# Patient Record
Sex: FEMALE | Race: WHITE | NOT HISPANIC OR LATINO | Employment: OTHER | URBAN - METROPOLITAN AREA
[De-identification: names, ages, dates, MRNs, and addresses within clinical notes are randomized per-mention and may not be internally consistent; named-entity substitution may affect disease eponyms.]

---

## 2018-05-14 LAB
ALBUMIN SERPL BCP-MCNC: 4.2 G/DL (ref 3.5–5.7)
ALP SERPL-CCNC: 55 IU/L (ref 55–165)
ALT SERPL W P-5'-P-CCNC: 17 IU/L (ref 5–26)
ANION GAP SERPL CALCULATED.3IONS-SCNC: 14.7 MM/L
APTT PPP: 25.6 SEC (ref 24.4–37.6)
AST SERPL W P-5'-P-CCNC: 36 U/L (ref 7–26)
BASOPHILS # BLD AUTO: 0.1 X3/UL (ref 0–0.3)
BASOPHILS # BLD AUTO: 0.9 % (ref 0–2)
BILIRUB SERPL-MCNC: 0.6 MG/DL (ref 0.3–1)
BILIRUB UR QL STRIP: NEGATIVE
BUN SERPL-MCNC: 38 MG/DL (ref 7–25)
CALCIUM SERPL-MCNC: 9.5 MG/DL (ref 8.6–10.5)
CHLORIDE SERPL-SCNC: 105 MM/L (ref 98–107)
CLARITY UR: CLEAR
CO2 SERPL-SCNC: 24 MM/L (ref 21–31)
COLOR UR: YELLOW
CREAT SERPL-MCNC: 0.96 MG/DL (ref 0.6–1.2)
DEPRECATED RDW RBC AUTO: 14.9 %
EGFR (HISTORICAL): 55 GFR
EGFR AFRICAN AMERICAN (HISTORICAL): > 60 GFR
EOSINOPHIL # BLD AUTO: 0.2 X3/UL (ref 0–0.5)
EOSINOPHIL NFR BLD AUTO: 2.8 % (ref 0–5)
GLUCOSE (HISTORICAL): 187 MG/DL (ref 65–99)
GLUCOSE UR STRIP-MCNC: ABNORMAL MG/DL
HCT VFR BLD AUTO: 36.1 % (ref 37–47)
HGB BLD-MCNC: 11.8 G/DL (ref 12–16)
HGB UR QL STRIP.AUTO: NEGATIVE
INR PPP: 1.05 (ref 0.9–1.5)
KETONES UR STRIP-MCNC: NEGATIVE MG/DL
LDL CHOLESTEROL DIRECT (HISTORICAL): 69 MG/DL
LEUKOCYTE ESTERASE UR QL STRIP: ABNORMAL
LIPASE SERPL-CCNC: 98 U/L (ref 11–82)
LYMPHOCYTES # BLD AUTO: 1.5 X3/UL (ref 1.2–4.2)
LYMPHOCYTES NFR BLD AUTO: 21.3 % (ref 20.5–51.1)
MCH RBC QN AUTO: 30.8 PG (ref 26–34)
MCHC RBC AUTO-ENTMCNC: 32.8 G/DL (ref 31–37)
MCV RBC AUTO: 94 FL (ref 81–99)
MONOCYTES # BLD AUTO: 0.6 X3/UL (ref 0–1)
MONOCYTES NFR BLD AUTO: 8.6 % (ref 1.7–12)
NEUTROPHILS # BLD AUTO: 4.8 X3/UL (ref 1.4–6.5)
NEUTS SEG NFR BLD AUTO: 66.4 % (ref 42.2–75.2)
NITRITE UR QL STRIP: NEGATIVE
NON-SQ EPI CELLS URNS QL MICRO: ABNORMAL /LPF
OSMOLALITY, SERUM (HISTORICAL): 292 MOSM (ref 262–291)
PH UR STRIP.AUTO: 6 [PH] (ref 4.5–8)
PLATELET # BLD AUTO: 207 X3/UL (ref 130–400)
PMV BLD AUTO: 9.8 FL
POTASSIUM SERPL-SCNC: 4.7 MM/L (ref 3.5–5.5)
PROT UR STRIP-MCNC: NEGATIVE MG/DL
PROTHROMBIN TIME (HISTORICAL): 12.1 SEC (ref 10.1–12.9)
RBC # BLD AUTO: 3.84 X6/UL (ref 3.9–5.2)
SODIUM SERPL-SCNC: 139 MM/L (ref 134–143)
SP GR UR STRIP.AUTO: 1.02 (ref 1–1.03)
TOTAL PROTEIN (HISTORICAL): 6.6 G/DL (ref 6.4–8.9)
TROPONIN I SERPL-MCNC: 0.03 NG/ML
TROPONIN I SERPL-MCNC: 6.44 NG/ML
TROPONIN I SERPL-MCNC: 9.37 NG/ML
TSH SERPL DL<=0.05 MIU/L-ACNC: 2.71 UIU/M (ref 0.45–5.33)
UROBILINOGEN UR QL STRIP.AUTO: 0.2 EU/DL (ref 0.2–8)
WBC # BLD AUTO: 7.2 X3/UL (ref 4.8–10.8)
WBC #/AREA URNS AUTO: ABNORMAL /HPF

## 2018-05-21 LAB
ALBUMIN SERPL BCP-MCNC: 3.6 G/DL (ref 3.5–5.7)
ALP SERPL-CCNC: 43 IU/L (ref 55–165)
ALT SERPL W P-5'-P-CCNC: 16 IU/L (ref 5–26)
AMPHETAMINES (HISTORICAL): NEGATIVE
ANION GAP SERPL CALCULATED.3IONS-SCNC: 10.2 MM/L
APTT PPP: 30.2 SEC (ref 24.4–37.6)
AST SERPL W P-5'-P-CCNC: 41 U/L (ref 7–26)
BACTERIA UR QL AUTO: ABNORMAL /HPF
BARBITURATES (HISTORICAL): NEGATIVE
BASOPHILS # BLD AUTO: 0 X3/UL (ref 0–0.3)
BASOPHILS # BLD AUTO: 0.4 % (ref 0–2)
BENZODIAZEPINES (HISTORICAL): NEGATIVE
BILIRUB SERPL-MCNC: 0.3 MG/DL (ref 0.3–1)
BILIRUB UR QL STRIP: NEGATIVE
BUN SERPL-MCNC: 37 MG/DL (ref 7–25)
CALCIUM SERPL-MCNC: 9.3 MG/DL (ref 8.6–10.5)
CANNABINOIDS (HISTORICAL): NEGATIVE
CHLORIDE SERPL-SCNC: 105 MM/L (ref 98–107)
CLARITY UR: CLEAR
CO2 SERPL-SCNC: 28 MM/L (ref 21–31)
COCAINE (HISTORICAL): NEGATIVE
COLOR UR: YELLOW
CREAT SERPL-MCNC: 1.04 MG/DL (ref 0.6–1.2)
DEPRECATED RDW RBC AUTO: 14.6 %
EGFR (HISTORICAL): 50 GFR
EGFR AFRICAN AMERICAN (HISTORICAL): 60 GFR
EOSINOPHIL # BLD AUTO: 0 X3/UL (ref 0–0.5)
EOSINOPHIL NFR BLD AUTO: 0.8 % (ref 0–5)
GLUCOSE (HISTORICAL): 143 MG/DL (ref 65–99)
GLUCOSE UR STRIP-MCNC: NEGATIVE MG/DL
HCT VFR BLD AUTO: 28.7 % (ref 37–47)
HGB BLD-MCNC: 9.3 G/DL (ref 12–16)
HGB UR QL STRIP.AUTO: NEGATIVE
INR PPP: 0.97 (ref 0.9–1.5)
KETONES UR STRIP-MCNC: NEGATIVE MG/DL
LDL CHOLESTEROL DIRECT (HISTORICAL): 52 MG/DL
LEUKOCYTE ESTERASE UR QL STRIP: ABNORMAL
LYMPHOCYTES # BLD AUTO: 0.8 X3/UL (ref 1.2–4.2)
LYMPHOCYTES NFR BLD AUTO: 12.7 % (ref 20.5–51.1)
MCH RBC QN AUTO: 30.7 PG (ref 26–34)
MCHC RBC AUTO-ENTMCNC: 32.5 G/DL (ref 31–37)
MCV RBC AUTO: 94.5 FL (ref 81–99)
METHADONE (HISTORICAL): NEGATIVE
MONOCYTES # BLD AUTO: 0.5 X3/UL (ref 0–1)
MONOCYTES NFR BLD AUTO: 9 % (ref 1.7–12)
NEUTROPHILS # BLD AUTO: 4.7 X3/UL (ref 1.4–6.5)
NEUTS SEG NFR BLD AUTO: 77.1 % (ref 42.2–75.2)
NITRITE UR QL STRIP: NEGATIVE
NON-SQ EPI CELLS URNS QL MICRO: ABNORMAL /LPF
OPIATES (HISTORICAL): NEGATIVE
OSMOLALITY, SERUM (HISTORICAL): 289 MOSM (ref 262–291)
OXYCODONE (HISTORICAL): NEGATIVE
PH UR STRIP.AUTO: 5.5 [PH] (ref 4.5–8)
PHENCYCLIDINE URINE (HISTORICAL): NEGATIVE
PLATELET # BLD AUTO: 173 X3/UL (ref 130–400)
PLEASE NOTE (HISTORICAL): NORMAL
PMV BLD AUTO: 9.5 FL
POTASSIUM SERPL-SCNC: 4.2 MM/L (ref 3.5–5.5)
PROPOXYPHENE (HISTORICAL): NEGATIVE
PROT UR STRIP-MCNC: NEGATIVE MG/DL
PROTHROMBIN TIME (HISTORICAL): 11.2 SEC (ref 10.1–12.9)
RBC # BLD AUTO: 3.04 X6/UL (ref 3.9–5.2)
RBC #/AREA URNS AUTO: ABNORMAL /HPF
SODIUM SERPL-SCNC: 139 MM/L (ref 134–143)
SP GR UR STRIP.AUTO: 1.02 (ref 1–1.03)
TOTAL PROTEIN (HISTORICAL): 5.9 G/DL (ref 6.4–8.9)
TROPONIN I SERPL-MCNC: 1.65 NG/ML
UROBILINOGEN UR QL STRIP.AUTO: 0.2 EU/DL (ref 0.2–8)
WBC # BLD AUTO: 6 X3/UL (ref 4.8–10.8)
WBC #/AREA URNS AUTO: ABNORMAL /HPF

## 2018-06-29 ENCOUNTER — HOSPITAL ENCOUNTER (EMERGENCY)
Facility: HOSPITAL | Age: 83
DRG: 377 | End: 2018-06-29
Attending: INTERNAL MEDICINE | Admitting: INTERNAL MEDICINE
Payer: MEDICARE

## 2018-06-29 ENCOUNTER — HOSPITAL ENCOUNTER (INPATIENT)
Facility: HOSPITAL | Age: 83
LOS: 2 days | Discharge: HOME | DRG: 377 | End: 2018-07-01
Attending: INTERNAL MEDICINE | Admitting: INTERNAL MEDICINE
Payer: MEDICARE

## 2018-06-29 ENCOUNTER — APPOINTMENT (EMERGENCY)
Dept: RADIOLOGY | Facility: HOSPITAL | Age: 83
DRG: 377 | End: 2018-06-29
Payer: MEDICARE

## 2018-06-29 VITALS
HEIGHT: 60 IN | HEART RATE: 68 BPM | SYSTOLIC BLOOD PRESSURE: 135 MMHG | WEIGHT: 105 LBS | DIASTOLIC BLOOD PRESSURE: 40 MMHG | OXYGEN SATURATION: 98 % | RESPIRATION RATE: 16 BRPM | BODY MASS INDEX: 20.62 KG/M2 | TEMPERATURE: 98.6 F

## 2018-06-29 DIAGNOSIS — K29.01 ACUTE SUPERFICIAL GASTRITIS WITH HEMORRHAGE: ICD-10-CM

## 2018-06-29 DIAGNOSIS — D64.9 ANEMIA: ICD-10-CM

## 2018-06-29 DIAGNOSIS — I25.10 CAD (CORONARY ARTERY DISEASE): ICD-10-CM

## 2018-06-29 DIAGNOSIS — D64.9 ANEMIA, UNSPECIFIED TYPE: Primary | ICD-10-CM

## 2018-06-29 DIAGNOSIS — K92.2 UPPER GI BLEED: Primary | ICD-10-CM

## 2018-06-29 PROBLEM — N18.30 CKD (CHRONIC KIDNEY DISEASE) STAGE 3, GFR 30-59 ML/MIN (HCC): Status: ACTIVE | Noted: 2018-05-15

## 2018-06-29 PROBLEM — I51.89 DIASTOLIC DYSFUNCTION: Status: ACTIVE | Noted: 2018-05-16

## 2018-06-29 PROBLEM — E11.9 TYPE 2 DIABETES MELLITUS WITHOUT COMPLICATION (HCC): Status: ACTIVE | Noted: 2018-05-15

## 2018-06-29 LAB
ABO GROUP BLD: NORMAL
ALBUMIN SERPL BCP-MCNC: 3.8 G/DL (ref 3.5–5.7)
ALP SERPL-CCNC: 53 U/L (ref 55–165)
ALT SERPL W P-5'-P-CCNC: 16 U/L (ref 7–52)
ANION GAP SERPL CALCULATED.3IONS-SCNC: 3 MMOL/L (ref 4–13)
APTT PPP: 29 SECONDS (ref 24–36)
AST SERPL W P-5'-P-CCNC: 24 U/L (ref 13–39)
BASOPHILS # BLD AUTO: 0 THOUSANDS/ΜL (ref 0–0.1)
BASOPHILS NFR BLD AUTO: 1 % (ref 0–1)
BILIRUB SERPL-MCNC: 0.4 MG/DL (ref 0.2–1)
BLD GP AB SCN SERPL QL: NEGATIVE
BNP SERPL-MCNC: 592 PG/ML (ref 1–100)
BUN SERPL-MCNC: 44 MG/DL (ref 7–25)
CALCIUM SERPL-MCNC: 9.2 MG/DL (ref 8.6–10.5)
CHLORIDE SERPL-SCNC: 105 MMOL/L (ref 98–107)
CO2 SERPL-SCNC: 32 MMOL/L (ref 21–31)
CREAT SERPL-MCNC: 0.93 MG/DL (ref 0.6–1.2)
EOSINOPHIL # BLD AUTO: 0.2 THOUSAND/ΜL (ref 0–0.61)
EOSINOPHIL NFR BLD AUTO: 4 % (ref 0–6)
ERYTHROCYTE [DISTWIDTH] IN BLOOD BY AUTOMATED COUNT: 13.9 % (ref 11.6–15.1)
GFR SERPL CREATININE-BSD FRML MDRD: 55 ML/MIN/1.73SQ M
GLUCOSE SERPL-MCNC: 105 MG/DL (ref 65–140)
GLUCOSE SERPL-MCNC: 149 MG/DL (ref 65–140)
HCT VFR BLD AUTO: 23 % (ref 37–47)
HGB BLD-MCNC: 7.6 G/DL (ref 11.5–15.4)
INR PPP: 1.06 (ref 0.9–1.5)
LYMPHOCYTES # BLD AUTO: 1.5 THOUSANDS/ΜL (ref 0.6–4.47)
LYMPHOCYTES NFR BLD AUTO: 23 % (ref 14–44)
MCH RBC QN AUTO: 30.2 PG (ref 26.8–34.3)
MCHC RBC AUTO-ENTMCNC: 33.2 G/DL (ref 31.4–37.4)
MCV RBC AUTO: 91 FL (ref 82–98)
MONOCYTES # BLD AUTO: 0.6 THOUSAND/ΜL (ref 0.17–1.22)
MONOCYTES NFR BLD AUTO: 10 % (ref 4–12)
NEUTROPHILS # BLD AUTO: 4.1 THOUSANDS/ΜL (ref 1.85–7.62)
NEUTS SEG NFR BLD AUTO: 63 % (ref 43–75)
NRBC BLD AUTO-RTO: 0 /100 WBCS
PLATELET # BLD AUTO: 173 THOUSANDS/UL (ref 149–390)
PMV BLD AUTO: 9.5 FL (ref 8.9–12.7)
POTASSIUM SERPL-SCNC: 4.3 MMOL/L (ref 3.5–5.5)
PROT SERPL-MCNC: 6.4 G/DL (ref 6.4–8.9)
PROTHROMBIN TIME: 12.2 SECONDS (ref 10.1–12.9)
RBC # BLD AUTO: 2.53 MILLION/UL (ref 3.81–5.12)
RH BLD: POSITIVE
SODIUM SERPL-SCNC: 140 MMOL/L (ref 134–143)
SPECIMEN EXPIRATION DATE: NORMAL
TROPONIN I SERPL-MCNC: <0.03 NG/ML
WBC # BLD AUTO: 6.5 THOUSAND/UL (ref 4.31–10.16)

## 2018-06-29 PROCEDURE — 82948 REAGENT STRIP/BLOOD GLUCOSE: CPT

## 2018-06-29 PROCEDURE — 86850 RBC ANTIBODY SCREEN: CPT | Performed by: INTERNAL MEDICINE

## 2018-06-29 PROCEDURE — 99223 1ST HOSP IP/OBS HIGH 75: CPT | Performed by: INTERNAL MEDICINE

## 2018-06-29 PROCEDURE — 86920 COMPATIBILITY TEST SPIN: CPT

## 2018-06-29 PROCEDURE — 80053 COMPREHEN METABOLIC PANEL: CPT | Performed by: INTERNAL MEDICINE

## 2018-06-29 PROCEDURE — 82270 OCCULT BLOOD FECES: CPT | Performed by: INTERNAL MEDICINE

## 2018-06-29 PROCEDURE — P9021 RED BLOOD CELLS UNIT: HCPCS

## 2018-06-29 PROCEDURE — 86901 BLOOD TYPING SEROLOGIC RH(D): CPT | Performed by: INTERNAL MEDICINE

## 2018-06-29 PROCEDURE — 85025 COMPLETE CBC W/AUTO DIFF WBC: CPT | Performed by: INTERNAL MEDICINE

## 2018-06-29 PROCEDURE — 71046 X-RAY EXAM CHEST 2 VIEWS: CPT

## 2018-06-29 PROCEDURE — 83880 ASSAY OF NATRIURETIC PEPTIDE: CPT | Performed by: INTERNAL MEDICINE

## 2018-06-29 PROCEDURE — 99285 EMERGENCY DEPT VISIT HI MDM: CPT

## 2018-06-29 PROCEDURE — 84484 ASSAY OF TROPONIN QUANT: CPT | Performed by: INTERNAL MEDICINE

## 2018-06-29 PROCEDURE — 93005 ELECTROCARDIOGRAM TRACING: CPT

## 2018-06-29 PROCEDURE — 30233N1 TRANSFUSION OF NONAUTOLOGOUS RED BLOOD CELLS INTO PERIPHERAL VEIN, PERCUTANEOUS APPROACH: ICD-10-PCS | Performed by: HOSPITALIST

## 2018-06-29 PROCEDURE — 86900 BLOOD TYPING SEROLOGIC ABO: CPT | Performed by: INTERNAL MEDICINE

## 2018-06-29 PROCEDURE — 36415 COLL VENOUS BLD VENIPUNCTURE: CPT | Performed by: INTERNAL MEDICINE

## 2018-06-29 PROCEDURE — 85610 PROTHROMBIN TIME: CPT | Performed by: INTERNAL MEDICINE

## 2018-06-29 PROCEDURE — 85730 THROMBOPLASTIN TIME PARTIAL: CPT | Performed by: INTERNAL MEDICINE

## 2018-06-29 RX ORDER — FUROSEMIDE 10 MG/ML
40 INJECTION INTRAMUSCULAR; INTRAVENOUS
Status: DISCONTINUED | OUTPATIENT
Start: 2018-06-30 | End: 2018-06-29

## 2018-06-29 RX ORDER — METOPROLOL TARTRATE 50 MG/1
50 TABLET, FILM COATED ORAL 2 TIMES DAILY
Status: DISCONTINUED | OUTPATIENT
Start: 2018-06-29 | End: 2018-06-30

## 2018-06-29 RX ORDER — CLOPIDOGREL BISULFATE 75 MG/1
75 TABLET ORAL DAILY
Status: DISCONTINUED | OUTPATIENT
Start: 2018-06-30 | End: 2018-07-01 | Stop reason: HOSPADM

## 2018-06-29 RX ORDER — NITROGLYCERIN 0.4 MG/1
0.4 TABLET SUBLINGUAL AS NEEDED
COMMUNITY
Start: 2018-05-17

## 2018-06-29 RX ORDER — CLOPIDOGREL BISULFATE 75 MG/1
75 TABLET ORAL DAILY
COMMUNITY
Start: 2018-05-18

## 2018-06-29 RX ORDER — METOPROLOL TARTRATE 50 MG/1
50 TABLET, FILM COATED ORAL 2 TIMES DAILY
COMMUNITY
Start: 2018-05-23

## 2018-06-29 RX ORDER — ASPIRIN 81 MG/1
81 TABLET, CHEWABLE ORAL
COMMUNITY
Start: 2018-05-18

## 2018-06-29 RX ORDER — ASPIRIN 81 MG/1
81 TABLET, CHEWABLE ORAL DAILY
Status: DISCONTINUED | OUTPATIENT
Start: 2018-06-30 | End: 2018-07-01 | Stop reason: HOSPADM

## 2018-06-29 RX ORDER — ACETAMINOPHEN 325 MG/1
650 TABLET ORAL EVERY 6 HOURS PRN
Status: DISCONTINUED | OUTPATIENT
Start: 2018-06-29 | End: 2018-07-01 | Stop reason: HOSPADM

## 2018-06-29 RX ORDER — LISINOPRIL 5 MG/1
5 TABLET ORAL DAILY
COMMUNITY
Start: 2018-05-18

## 2018-06-29 RX ORDER — LISINOPRIL 5 MG/1
5 TABLET ORAL DAILY
Status: DISCONTINUED | OUTPATIENT
Start: 2018-06-30 | End: 2018-06-30

## 2018-06-29 RX ORDER — MELOXICAM 7.5 MG/1
7.5 TABLET ORAL DAILY
COMMUNITY

## 2018-06-29 RX ORDER — ATORVASTATIN CALCIUM 40 MG/1
40 TABLET, FILM COATED ORAL
COMMUNITY
Start: 2018-05-17

## 2018-06-29 RX ORDER — ATORVASTATIN CALCIUM 40 MG/1
40 TABLET, FILM COATED ORAL
Status: DISCONTINUED | OUTPATIENT
Start: 2018-06-29 | End: 2018-07-01 | Stop reason: HOSPADM

## 2018-06-29 RX ADMIN — METOPROLOL TARTRATE 50 MG: 50 TABLET ORAL at 21:54

## 2018-06-29 RX ADMIN — ACETAMINOPHEN 650 MG: 325 TABLET ORAL at 21:35

## 2018-06-29 NOTE — ED PROVIDER NOTES
History  Chief Complaint   Patient presents with    Leg Swelling     80years old female with current history of acute myocardial infarction status post 3 stents placement 6 weeks ago at [facility] presents to the emergency room this afternoon with bilateral legs and ankle edema and swelling started this morning associated with no chest pain or shortness of breath abdominal pain nausea vomiting fever or chills  Patient currently is not taking on any Lasix   History provided by:  Patient  Ankle Swelling   Location:  Leg, ankle and foot  Injury: no    Leg location:  L leg and R leg  Pain details:     Severity:  No pain  Chronicity:  New  Associated symptoms: fatigue    Associated symptoms: no back pain and no fever        Prior to Admission Medications   Prescriptions Last Dose Informant Patient Reported? Taking?    Amino Acids (DAILY AMINO 6000 PO) 6/29/2018 at Unknown time  Yes Yes   Sig: Take 20 mg by mouth daily   aspirin 81 mg chewable tablet 6/29/2018 at Unknown time  Yes Yes   Sig: Chew 81 mg   atorvastatin (LIPITOR) 40 mg tablet 6/29/2018 at Unknown time  Yes Yes   Sig: Take 40 mg by mouth   clopidogrel (PLAVIX) 75 mg tablet 6/29/2018 at Unknown time  Yes Yes   Sig: Take 75 mg by mouth daily   lisinopril (ZESTRIL) 5 mg tablet 6/29/2018 at Unknown time  Yes Yes   Sig: Take 5 mg by mouth daily   meloxicam (MOBIC) 7 5 mg tablet 6/29/2018 at Unknown time  Yes Yes   Sig: Take 7 5 mg by mouth daily   metoprolol tartrate (LOPRESSOR) 50 mg tablet 6/29/2018 at Unknown time  Yes Yes   Sig: Take 50 mg by mouth 2 (two) times a day   nitroglycerin (NITROSTAT) 0 4 mg SL tablet More than a month at Unknown time  Yes No   Sig: Place 0 4 mg under the tongue as needed      Facility-Administered Medications: None       Past Medical History:   Diagnosis Date    [facility]     [facility] infarct [facility]     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([facility] )        Past Surgical History:   Procedure Laterality Date    APPENDECTOMY  CARDIAC CATHETERIZATION      3 stents    CAROTID ENDARTARECTOMY      GALLBLADDER SURGERY      HYSTERECTOMY         History reviewed  No pertinent family history  I have reviewed and agree with the history as documented  Social History   Substance Use Topics    Smoking status: Never Smoker    Smokeless tobacco: Never Used    Alcohol use No        Review of Systems   Constitutional: Positive for fatigue  Negative for chills and fever  HENT: Negative  Negative for rhinorrhea and sore throat  Eyes: Negative  Negative for visual disturbance  Respiratory: Negative  Negative for cough and shortness of breath  Cardiovascular: Positive for leg swelling  Negative for chest pain  Gastrointestinal: Negative  Negative for abdominal pain, diarrhea, nausea and vomiting  Endocrine: Negative  Genitourinary: Negative  Negative for dysuria  Musculoskeletal: Negative  Negative for back pain and myalgias  Skin: Negative for rash  Allergic/Immunologic: Negative  Neurological: Negative  Negative for dizziness and headaches  Psychiatric/Behavioral: Negative  Negative for confusion  All other systems reviewed and are negative  Physical Exam  Physical Exam   Constitutional: She is oriented to person, place, and time  She appears well-developed and well-nourished  No distress  HENT:   Head: Normocephalic and atraumatic  Eyes: EOM are normal  Pupils are equal, round, and reactive to light  Neck: Normal range of motion  Neck supple  No JVD present  No tracheal deviation present  No thyromegaly present  Cardiovascular: Normal rate, regular rhythm, normal heart sounds and intact distal pulses  Exam reveals no gallop and no friction rub  No murmur heard  Pulmonary/Chest: Effort normal  No respiratory distress  She has rales  Abdominal: Soft  Bowel sounds are normal  She exhibits no distension  Neurological: She is alert and oriented to person, place, and time     Skin: Skin is warm and dry  She is not diaphoretic  Psychiatric: She has a normal mood and affect   Her behavior is normal        Vital Signs  ED Triage Vitals [06/29/18 1243]   Temperature Pulse Respirations Blood Pressure SpO2   98 4 °F (36 9 °C) 64 18 143/58 96 %      Temp Source Heart Rate Source Patient Position - Orthostatic VS BP Location FiO2 (%)   Tympanic Monitor Sitting Left arm --      Pain Score       No Pain           Vitals:    06/29/18 1243   BP: 143/58   Pulse: 64   Patient Position - Orthostatic VS: Sitting       Visual Acuity      ED Medications  Medications - No data to display    Diagnostic Studies  Results Reviewed     Procedure Component Value Units Date/Time    POCT occult blood stool [[id number]]     Lab Status:  No result Specimen:  Stool     B-Type Natriuretic Peptide [facility] ONLY) [[id number]]  (Abnormal) Collected:  06/29/18 1312    Lab Status:  Final result Specimen:  Blood from Arm, Left Updated:  06/29/18 1349      (H) pg/mL     Protime-INR [[id number]]  (Normal) Collected:  06/29/18 1312    Lab Status:  Final result Specimen:  Blood from Arm, Left Updated:  06/29/18 1348     Protime 12 2 seconds      INR 1 06    APTT [[id number]]  (Normal) Collected:  06/29/18 1312    Lab Status:  Final result Specimen:  Blood from Arm, Left Updated:  06/29/18 1347     PTT 29 seconds     Comprehensive metabolic panel [[id number]]  (Abnormal) Collected:  06/29/18 1312    Lab Status:  Final result Specimen:  Blood from Arm, Left Updated:  06/29/18 1347     Sodium 140 mmol/L      Potassium 4 3 mmol/L      Chloride 105 mmol/L      CO2 32 (H) mmol/L      Anion Gap 3 (L) mmol/L      BUN 44 (H) mg/dL      Creatinine 0 93 mg/dL      Glucose 149 (H) mg/dL      Calcium 9 2 mg/dL      AST 24 U/L      ALT 16 U/L      Alkaline Phosphatase 53 (L) U/L      Total Protein 6 4 g/dL      Albumin 3 8 g/dL      Total Bilirubin 0 40 mg/dL      eGFR 55 ml/min/1 73sq m     Narrative:         National Kidney Disease Education Program recommendations are as follows:  GFR calculation is accurate only with a steady state creatinine  Chronic Kidney disease less than 60 ml/min/1 73 sq  meters  Kidney failure less than 15 ml/min/1 73 sq  meters  Troponin I [[id number]]  (Normal) Collected:  06/29/18 1312    Lab Status:  Final result Specimen:  Blood from Arm, Left Updated:  06/29/18 1347     Troponin I <0 03 ng/mL     CBC and differential [[id number]]  (Abnormal) Collected:  06/29/18 1312    Lab Status:  Final result Specimen:  Blood from Arm, Left Updated:  06/29/18 1345     WBC 6 50 Thousand/uL      RBC 2 53 (L) Million/uL      Hemoglobin 7 6 (L) g/dL      Hematocrit 23 0 (L) %      MCV 91 fL      MCH 30 2 pg      MCHC 33 2 g/dL      RDW 13 9 %      MPV 9 5 fL      Platelets 936 Thousands/uL      nRBC 0 /100 WBCs      Neutrophils Relative 63 %      Lymphocytes Relative 23 %      Monocytes Relative 10 %      Eosinophils Relative 4 %      Basophils Relative 1 %      Neutrophils Absolute 4 10 Thousands/µL      Lymphocytes Absolute 1 50 Thousands/µL      Monocytes Absolute 0 60 Thousand/µL      Eosinophils Absolute 0 20 Thousand/µL      Basophils Absolute 0 00 Thousands/µL                  X-ray chest 2 views   Final Result by Carolyn Reyna MD (06/29 1523)   Mild suboptimal inspiration without finding of active focal pulmonary   consolidation with air bronchograms or large pleural effusion  Cardiomegaly with mild congestive changes without overt cardiac failure           Signed by Carolyn Reyna MD                 Procedures  Procedures       Phone Contacts  ED Phone Contact    ED Course  ED Course as of Jun 29 1535 Fri Jun 29, 2018   1442 Hemoglobin: (!) 7 6   1512 BNP: (!) 592   1513 Nsr, hr 61 , PAC;S old anterior -septal infarct ECG 12 lead                               MDM  CritCare Time    Disposition  Final diagnoses:   Upper GI bleed   Anemia     Time reflects when diagnosis was documented in both MDM as applicable and the Disposition within this note     Time User Action Codes Description Comment    6/29/2018  3:17 PM Geraldmarc Guaman Add [K92 2] Upper GI bleed     6/29/2018  3:18 PM Tri Bhatt Add [D64 9] Anemia       ED Disposition     ED Disposition Condition Comment    Transfer to Another [address] [place] [address] 34 should be transferred out to [facility]   MD Documentation      Most Recent Value   Patient Condition  The patient has been stabilized such that within reasonable medical probability, no material deterioration of the patient condition or the condition of the unborn child(maris) is likely to result from the transfer   Reason for Transfer  Level of Care needed not available at this facility   Benefits of Transfer  Other benefits (Include comment)_______________________, Specialized equipment and/or services available at the receiving facility (Include comment)________________________ [facility] ]   Risks of Transfer  Potential deterioration of medical condition, Potential for delay in receiving treatment, Possible worsening of condition or death during transfer   Accepting Physician  -- [Dr Vaughn]   Accepting Facility Name, [place] [address]   -- [[facility]]   Sending MD  -- Clint Adjutant      RN Documentation      Most [facility] Name, [place] [address]   -- [[facility]]      Follow-up Information    None         Patient's Medications   Discharge Prescriptions    No medications on file     No discharge procedures on file      ED Provider  Electronically Signed by           Dyllan Serrato MD  06/29/18 1476

## 2018-06-29 NOTE — ASSESSMENT & PLAN NOTE
-suspected GI bleed with stool positive for occult blood  -transfuse 2 units PRBC  -will continue ASA/Plavix given patient recently had stents placed  -no melena or hematemesis  -GI consult  -Protonix IV

## 2018-06-29 NOTE — EMTALA/ACUTE CARE TRANSFER
[facility] [address]  [address] [place] [id number]  [phone]  Dept: [phone]      EMTALA TRANSFER CONSENT    NAME Valeria PRADO 1928                              MRN [id number]    I have been informed of my rights regarding examination, treatment, and transfer   by Dr Marquise Causey MD    Benefits: Other benefits (Include comment)_______________________, Specialized equipment and/or services available at the receiving facility (Include comment)________________________ ([facility] CARDIOLOGY )    Risks: Potential deterioration of medical condition, Potential for delay in receiving treatment, Possible worsening of condition or death during transfer      Consent for Transfer:  I acknowledge that my medical condition has been evaluated and explained to me by the emergency department physician or other qualified medical person and/or my attending physician, who has recommended that I be transferred to the service of  Accepting Physician:  (Dr Kaitlynn Sher) at [address] Name, [address] :  [facility])  The above potential benefits of such transfer, the potential risks associated with such transfer, and the probable risks of not being transferred have been explained to me, and I fully understand them  The doctor has explained that, in my case, the benefits of transfer outweigh the risks  I agree to be transferred  I authorize the performance of emergency medical procedures and treatments upon me in both transit and upon arrival at the receiving facility  Additionally, I authorize the release of any and all medical records to the receiving facility and request they be transported with me, if possible  I understand that the safest mode of transportation during a medical emergency is an ambulance and that the Hospital advocates the use of this mode of transport   Risks of traveling to the receiving facility by car, including absence of medical control, life sustaining equipment, such as oxygen, and medical personnel has been explained to me and I fully understand them  (VELVET CORRECT BOX BELOW)  [  ]  I consent to the stated transfer and to be transported by ambulance/helicopter  [  ]  I consent to the stated transfer, but refuse transportation by ambulance and accept full responsibility for my transportation by car  I understand the risks of non-ambulance transfers and I exonerate the Hospital and its staff from any deterioration in my condition that results from this refusal     X___________________________________________    DATE  18  TIME________  Signature of patient or legally responsible individual signing on patient behalf           RELATIONSHIP TO PATIENT_________________________          Provider Certification    NAME Aníbal Rojas                                         1928                              MRN [id number]    A medical screening exam was performed on the above named patient  Based on the examination:    Condition Necessitating Transfer The primary encounter diagnosis was Upper GI bleed  A diagnosis of Anemia was also pertinent to this visit      Patient Condition: The patient has been stabilized such that within reasonable medical probability, no material deterioration of the patient condition or the condition of the unborn child(maris) is likely to result from the transfer    Reason for Transfer: Level of Care needed not available at this facility    Transfer Requirements: Facility  [facility] [place])   · Space available and qualified personnel available for treatment as acknowledged by    · Agreed to accept transfer and to provide appropriate medical treatment as acknowledged by        (Dr Romain Holt)  · Appropriate medical records of the examination and treatment of the patient are provided at the time of transfer   [address] _______  · Transfer will be performed by qualified personnel from and appropriate transfer equipment as required, including the use of necessary and appropriate life support measures  Provider Certification: I have examined the patient and explained the following risks and benefits of being transferred/refusing transfer to the patient/family:         Based on these reasonable risks and benefits to the patient and/or the unborn child(maris), and based upon the information available at the time of the patients examination, I certify that the medical benefits reasonably to be expected from the provision of appropriate medical treatments at another medical facility outweigh the increasing risks, if any, to the individuals medical condition, and in the case of labor to the unborn child, from effecting the transfer      X____________________________________________ DATE 06/29/18        TIME_______      ORIGINAL - SEND TO MEDICAL RECORDS   COPY - SEND WITH PATIENT DURING TRANSFER

## 2018-06-29 NOTE — ASSESSMENT & PLAN NOTE
-recent stent placement in May at [facility]  -cont asa/plavix  -cont statin, betablocker  -cardio eval

## 2018-06-29 NOTE — H&P
H&P- Merrill Nye 9/18/1928, 80 y o  female MRN: [id number]    Unit/Bed#: -01 Encounter: [id number]    Primary Care Provider: No primary care provider on file  Date and time admitted to hospital: 6/29/2018  4:25 PM        * Anemia, unspecified   Assessment & Plan    -suspected GI bleed with stool positive for occult blood  -transfuse 2 units PRBC  -will continue ASA/Plavix given patient recently had stents placed  -no melena or hematemesis  -GI consult  -Protonix IV        Diastolic dysfunction   Assessment & Plan    -monitor I/O and daily wt  -start lasix given lower ext edema  -cont lisinopril and betablocker          CAD (coronary artery disease)   Assessment & Plan    -recent stent placement in May at [facility]  -cont asa/plavix  -cont statin, betablocker  -cardio eval        CKD (chronic kidney disease) stage 3, GFR 30-59 ml/min   Assessment & Plan    -FS ac/hs  -diabetic diet          VTE Prophylaxis: Pharmacologic VTE Prophylaxis contraindicated due to suspected gi bleed  / reason for no mechanical VTE prophylaxis edema bilateral lower ext   Code Status: DNR  POLST: There is no POLST form on file for this patient (pre-hospital)  Discussion with family: no    Anticipated Length of Stay:  Patient will be admitted on an Inpatient basis with an anticipated length of stay of  Greater than 2 midnights  Justification for Hospital Stay: anemia    Total Time for Visit, including Counseling / Coordination of Care: 45 minutes  Greater than 50% of this total time spent on direct patient counseling and coordination of care  Chief Complaint:   Lower ext edema    History of Present Illness:    Merrill Nye is a 80 y o  female who presents with lower ext swelling  Swelling started this morning when she woke up  No previous episodes  Pt does not take any diuretic at home  Recently treated for NSTEMI [address] with multiple stents placed  Pt denies any CP, SOB, or palpitations        In ER patient was found to be anemia with Hgb < 8  Stool positive for occult blood  Admitted for gi eval    Review of Systems:    Review of Systems   Constitutional: Negative for activity change, diaphoresis, fatigue and fever  Respiratory: Negative for cough, shortness of breath and wheezing  Cardiovascular: Positive for leg swelling  Negative for chest pain and palpitations  Gastrointestinal: Negative for blood in stool, nausea and vomiting  All other systems reviewed and are negative  Past Medical and Surgical History:     Past Medical History:   Diagnosis Date    [facility]     [facility]     Pt has 3 stents placed in the heart    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([facility] )        Past Surgical History:   Procedure Laterality Date    APPENDECTOMY      CARDIAC CATHETERIZATION      3 stents    CAROTID ENDARTARECTOMY      GALLBLADDER SURGERY      HYSTERECTOMY         Meds/Allergies:    Prior to Admission medications    Medication Sig Start Date End Date Taking? Authorizing Provider   Amino Acids (DAILY AMINO 6000 PO) Take 20 mg by mouth daily   Yes Historical Provider, MD   aspirin 81 mg chewable tablet Chew 81 mg 5/18/18  Yes Historical Provider, MD   clopidogrel (PLAVIX) 75 mg tablet Take 75 mg by mouth daily 5/18/18  Yes Historical Provider, MD   atorvastatin (LIPITOR) 40 mg tablet Take 40 mg by mouth 5/17/18   Historical Provider, MD   lisinopril (ZESTRIL) 5 mg tablet Take 5 mg by mouth daily 5/18/18   Historical Provider, MD   meloxicam (MOBIC) 7 5 mg tablet Take 7 5 mg by mouth daily    Historical Provider, MD   metoprolol tartrate (LOPRESSOR) 50 mg tablet Take 50 mg by mouth 2 (two) times a day 5/23/18   Historical Provider, MD   nitroglycerin (NITROSTAT) 0 4 mg SL tablet Place 0 4 mg under the tongue as needed 5/17/18   Historical Provider, MD     I have reviewed home medications with patient personally  Allergies:    Allergies   Allergen Reactions    Penicillins Rash       Social History:     Marital Status:    Occupation: none  Patient Pre-hospital Living Situation: alone  Patient Pre-hospital Level of Mobility: with assisted device  Patient Pre-hospital Diet Restrictions: none  Substance Use History:   History   Alcohol Use No     History   Smoking Status    Never Smoker   Smokeless Tobacco    Never Used     History   Drug Use No       Family History:    History reviewed  No pertinent family history  Physical Exam:     Vitals:   Blood Pressure: 136/62 (06/29/18 1629)  Pulse: 58 (06/29/18 1629)  Temperature: (!) 96 9 °F (36 1 °C) (06/29/18 1629)  Temp Source: Tympanic (06/29/18 1629)  Respirations: 16 (06/29/18 1629)  SpO2: 93 % (06/29/18 1629)    Physical Exam   Constitutional: She is oriented to person, place, and time  She appears well-developed and well-nourished  HENT:   Head: Normocephalic and atraumatic  Eyes: Conjunctivae and EOM are normal    Neck: Normal range of motion  Neck supple  Cardiovascular: Normal rate and regular rhythm  Pulmonary/Chest: Effort normal and breath sounds normal    Abdominal: Soft  She exhibits no distension  There is no tenderness  Musculoskeletal: Normal range of motion  She exhibits edema  Neurological: She is oriented to person, place, and time  Skin: Skin is warm and dry  Psychiatric: She has a normal mood and affect  Additional Data:     Lab Results: I have personally reviewed pertinent reports          Results from last 7 days  Lab Units 06/29/18  1312   WBC Thousand/uL 6 50   HEMOGLOBIN g/dL 7 6*   HEMATOCRIT % 23 0*   PLATELETS Thousands/uL 173   NEUTROS PCT % 63   LYMPHS PCT % 23   MONOS PCT % 10   EOS PCT % 4       Results from last 7 days  Lab Units 06/29/18  1312   SODIUM mmol/L 140   POTASSIUM mmol/L 4 3   CHLORIDE mmol/L 105   CO2 mmol/L 32*   BUN mg/dL 44*   CREATININE mg/dL 0 93   CALCIUM mg/dL 9 2   TOTAL PROTEIN g/dL 6 4   BILIRUBIN TOTAL mg/dL 0 40   ALK PHOS U/L 53*   ALT U/L 16   AST U/L 24   GLUCOSE RANDOM mg/dL 149* Results from last 7 days  Lab Units 06/29/18  1312   INR  1 06               Imaging: I have personally reviewed pertinent reports  No orders to display       EKG, Pathology, and Other Studies Reviewed on Admission:   · EKG: not reviewed    Allscripts / Epic Records Reviewed: Yes     ** Please Note: This note has been constructed using a voice recognition system   **

## 2018-06-30 ENCOUNTER — ANESTHESIA EVENT (INPATIENT)
Dept: PERIOP | Facility: HOSPITAL | Age: 83
DRG: 377 | End: 2018-06-30
Payer: MEDICARE

## 2018-06-30 ENCOUNTER — ANESTHESIA (INPATIENT)
Dept: PERIOP | Facility: HOSPITAL | Age: 83
DRG: 377 | End: 2018-06-30
Payer: MEDICARE

## 2018-06-30 PROBLEM — K29.01 ACUTE GASTRITIS WITH HEMORRHAGE: Status: ACTIVE | Noted: 2018-06-30

## 2018-06-30 PROBLEM — K44.9 HIATAL HERNIA: Status: ACTIVE | Noted: 2018-06-30

## 2018-06-30 PROBLEM — D64.9 ANEMIA: Status: RESOLVED | Noted: 2018-06-29 | Resolved: 2018-06-30

## 2018-06-30 PROBLEM — K29.71 GASTRITIS AND GASTRODUODENITIS WITH HEMORRHAGE: Status: ACTIVE | Noted: 2018-06-30

## 2018-06-30 PROBLEM — K29.91 GASTRITIS AND GASTRODUODENITIS WITH HEMORRHAGE: Status: ACTIVE | Noted: 2018-06-30

## 2018-06-30 LAB
ANION GAP SERPL CALCULATED.3IONS-SCNC: 7 MMOL/L (ref 4–13)
BUN SERPL-MCNC: 33 MG/DL (ref 7–25)
CALCIUM SERPL-MCNC: 9.3 MG/DL (ref 8.6–10.5)
CHLORIDE SERPL-SCNC: 105 MMOL/L (ref 98–107)
CO2 SERPL-SCNC: 28 MMOL/L (ref 21–31)
CREAT SERPL-MCNC: 0.74 MG/DL (ref 0.6–1.2)
ERYTHROCYTE [DISTWIDTH] IN BLOOD BY AUTOMATED COUNT: 16 % (ref 11.5–14.5)
GFR SERPL CREATININE-BSD FRML MDRD: 72 ML/MIN/1.73SQ M
GLUCOSE SERPL-MCNC: 100 MG/DL (ref 65–140)
GLUCOSE SERPL-MCNC: 102 MG/DL (ref 65–99)
GLUCOSE SERPL-MCNC: 104 MG/DL (ref 65–140)
GLUCOSE SERPL-MCNC: 189 MG/DL (ref 65–140)
GLUCOSE SERPL-MCNC: 211 MG/DL (ref 65–140)
HCT VFR BLD AUTO: 31.4 % (ref 34.8–46.1)
HGB BLD-MCNC: 10.6 G/DL (ref 12–16)
MCH RBC QN AUTO: 29.6 PG (ref 26–34)
MCHC RBC AUTO-ENTMCNC: 33.9 G/DL (ref 31–37)
MCV RBC AUTO: 88 FL (ref 81–99)
PLATELET # BLD AUTO: 149 THOUSANDS/UL (ref 149–390)
PMV BLD AUTO: 8.8 FL (ref 8.6–11.7)
POTASSIUM SERPL-SCNC: 4.1 MMOL/L (ref 3.5–5.5)
RBC # BLD AUTO: 3.59 MILLION/UL (ref 3.9–5.2)
SODIUM SERPL-SCNC: 140 MMOL/L (ref 134–143)
WBC # BLD AUTO: 10.4 THOUSAND/UL (ref 4.8–10.8)

## 2018-06-30 PROCEDURE — 88341 IMHCHEM/IMCYTCHM EA ADD ANTB: CPT | Performed by: PATHOLOGY

## 2018-06-30 PROCEDURE — C9113 INJ PANTOPRAZOLE SODIUM, VIA: HCPCS | Performed by: INTERNAL MEDICINE

## 2018-06-30 PROCEDURE — 82948 REAGENT STRIP/BLOOD GLUCOSE: CPT

## 2018-06-30 PROCEDURE — 93005 ELECTROCARDIOGRAM TRACING: CPT

## 2018-06-30 PROCEDURE — 0DB78ZX EXCISION OF STOMACH, PYLORUS, VIA NATURAL OR ARTIFICIAL OPENING ENDOSCOPIC, DIAGNOSTIC: ICD-10-PCS | Performed by: SURGERY

## 2018-06-30 PROCEDURE — 99222 1ST HOSP IP/OBS MODERATE 55: CPT | Performed by: STUDENT IN AN ORGANIZED HEALTH CARE EDUCATION/TRAINING PROGRAM

## 2018-06-30 PROCEDURE — C9113 INJ PANTOPRAZOLE SODIUM, VIA: HCPCS | Performed by: SURGERY

## 2018-06-30 PROCEDURE — 88342 IMHCHEM/IMCYTCHM 1ST ANTB: CPT | Performed by: PATHOLOGY

## 2018-06-30 PROCEDURE — 88305 TISSUE EXAM BY PATHOLOGIST: CPT | Performed by: PATHOLOGY

## 2018-06-30 PROCEDURE — 80048 BASIC METABOLIC PNL TOTAL CA: CPT | Performed by: INTERNAL MEDICINE

## 2018-06-30 PROCEDURE — 85027 COMPLETE CBC AUTOMATED: CPT | Performed by: INTERNAL MEDICINE

## 2018-06-30 RX ORDER — KETAMINE HYDROCHLORIDE 50 MG/ML
INJECTION, SOLUTION, CONCENTRATE INTRAMUSCULAR; INTRAVENOUS AS NEEDED
Status: DISCONTINUED | OUTPATIENT
Start: 2018-06-30 | End: 2018-06-30 | Stop reason: SURG

## 2018-06-30 RX ORDER — LISINOPRIL 20 MG/1
20 TABLET ORAL DAILY
Status: DISCONTINUED | OUTPATIENT
Start: 2018-06-30 | End: 2018-07-01 | Stop reason: HOSPADM

## 2018-06-30 RX ORDER — FUROSEMIDE 10 MG/ML
40 INJECTION INTRAMUSCULAR; INTRAVENOUS ONCE
Status: COMPLETED | OUTPATIENT
Start: 2018-06-30 | End: 2018-06-30

## 2018-06-30 RX ORDER — SODIUM CHLORIDE 9 MG/ML
INJECTION, SOLUTION INTRAVENOUS CONTINUOUS PRN
Status: DISCONTINUED | OUTPATIENT
Start: 2018-06-30 | End: 2018-06-30 | Stop reason: SURG

## 2018-06-30 RX ORDER — MAGNESIUM HYDROXIDE 1200 MG/15ML
LIQUID ORAL AS NEEDED
Status: DISCONTINUED | OUTPATIENT
Start: 2018-06-30 | End: 2018-06-30 | Stop reason: HOSPADM

## 2018-06-30 RX ORDER — PANTOPRAZOLE SODIUM 40 MG/1
40 INJECTION, POWDER, FOR SOLUTION INTRAVENOUS EVERY 12 HOURS SCHEDULED
Status: DISCONTINUED | OUTPATIENT
Start: 2018-06-30 | End: 2018-07-01

## 2018-06-30 RX ORDER — HYDRALAZINE HYDROCHLORIDE 20 MG/ML
5 INJECTION INTRAMUSCULAR; INTRAVENOUS EVERY 6 HOURS PRN
Status: DISCONTINUED | OUTPATIENT
Start: 2018-06-30 | End: 2018-07-01 | Stop reason: HOSPADM

## 2018-06-30 RX ORDER — METOPROLOL TARTRATE 5 MG/5ML
INJECTION INTRAVENOUS AS NEEDED
Status: DISCONTINUED | OUTPATIENT
Start: 2018-06-30 | End: 2018-06-30 | Stop reason: SURG

## 2018-06-30 RX ORDER — LIDOCAINE HYDROCHLORIDE 10 MG/ML
INJECTION, SOLUTION INFILTRATION; PERINEURAL AS NEEDED
Status: DISCONTINUED | OUTPATIENT
Start: 2018-06-30 | End: 2018-06-30 | Stop reason: SURG

## 2018-06-30 RX ORDER — SUCRALFATE ORAL 1 G/10ML
1000 SUSPENSION ORAL
Status: DISCONTINUED | OUTPATIENT
Start: 2018-06-30 | End: 2018-07-01 | Stop reason: HOSPADM

## 2018-06-30 RX ORDER — PROPOFOL 10 MG/ML
INJECTION, EMULSION INTRAVENOUS AS NEEDED
Status: DISCONTINUED | OUTPATIENT
Start: 2018-06-30 | End: 2018-06-30 | Stop reason: SURG

## 2018-06-30 RX ADMIN — LISINOPRIL 20 MG: 20 TABLET ORAL at 12:35

## 2018-06-30 RX ADMIN — LISINOPRIL 5 MG: 5 TABLET ORAL at 10:36

## 2018-06-30 RX ADMIN — SODIUM CHLORIDE: 0.9 INJECTION, SOLUTION INTRAVENOUS at 09:24

## 2018-06-30 RX ADMIN — METOPROLOL TARTRATE 25 MG: 25 TABLET ORAL at 17:00

## 2018-06-30 RX ADMIN — CLOPIDOGREL BISULFATE 75 MG: 75 TABLET ORAL at 10:37

## 2018-06-30 RX ADMIN — ASPIRIN 81 MG 81 MG: 81 TABLET ORAL at 10:37

## 2018-06-30 RX ADMIN — METOPROLOL TARTRATE 2 MG: 1 INJECTION, SOLUTION INTRAVENOUS at 09:38

## 2018-06-30 RX ADMIN — KETAMINE HYDROCHLORIDE 25 MG: 50 INJECTION, SOLUTION INTRAMUSCULAR; INTRAVENOUS at 09:32

## 2018-06-30 RX ADMIN — METOPROLOL TARTRATE 50 MG: 50 TABLET ORAL at 10:37

## 2018-06-30 RX ADMIN — SODIUM CHLORIDE 8 MG/HR: 9 INJECTION, SOLUTION INTRAVENOUS at 02:51

## 2018-06-30 RX ADMIN — ATORVASTATIN CALCIUM 40 MG: 40 TABLET, FILM COATED ORAL at 16:58

## 2018-06-30 RX ADMIN — SUCRALFATE 1000 MG: 1 SUSPENSION ORAL at 16:57

## 2018-06-30 RX ADMIN — PROPOFOL 50 MG: 10 INJECTION, EMULSION INTRAVENOUS at 09:32

## 2018-06-30 RX ADMIN — LIDOCAINE HYDROCHLORIDE 5 ML: 10 INJECTION, SOLUTION INFILTRATION; PERINEURAL at 09:32

## 2018-06-30 RX ADMIN — PANTOPRAZOLE SODIUM 40 MG: 40 INJECTION, POWDER, FOR SOLUTION INTRAVENOUS at 21:29

## 2018-06-30 RX ADMIN — FUROSEMIDE 40 MG: 10 INJECTION, SOLUTION INTRAVENOUS at 12:35

## 2018-06-30 NOTE — ANESTHESIA PREPROCEDURE EVALUATION
Review of Systems/Medical History  Patient summary reviewed  Chart reviewed  No history of anesthetic complications     Cardiovascular  Exercise tolerance (METS): >4,  Hyperlipidemia, Hypertension on > 1 medication, Past MI 0-3 months, CAD , CAD status: 3VD, Cardiac stents drug eluting and < 6 months No angina , CHF compensated CHF, No orthopnea, No GRIFFIN,   Pulmonary hypertension Pulmonary       GI/Hepatic    GI bleeding , active, PUD,             Endo/Other  Diabetes well controlled Diet controlled,      GYN       Hematology  Anemia acute blood loss anemia,     Musculoskeletal       Neurology    CVA , no residual symptoms,    Psychology           Physical Exam    Airway    Mallampati score: III  TM Distance: >3 FB  Neck ROM: full     Dental   lower dentures and upper dentures,     Cardiovascular      Pulmonary      Other Findings        Anesthesia Plan  ASA Score- 4 Emergent    Anesthesia Type- IV sedation with anesthesia with ASA Monitors  Additional Monitors:   Airway Plan:         Plan Factors-    Induction- intravenous  Postoperative Plan-     Informed Consent- Anesthetic plan and risks discussed with patient

## 2018-06-30 NOTE — NURSING NOTE
Pt tolerated transfusion of 2 units of PBRCs  No adverse reactions noted  Resting comfortably in bed  Easily awakened  Offers no complaints  Protonix drip started from held order  Will continue to monitor  Call bell in reach, bed in lowest position    Mounika Raymond, RN

## 2018-06-30 NOTE — PLAN OF CARE
HEMATOLOGIC - ADULT     Maintains hematologic stability Adequate for Discharge          DISCHARGE PLANNING     Discharge to home or other facility with appropriate resources Progressing        INFECTION - ADULT     Absence or prevention of progression during hospitalization Progressing        Knowledge Deficit     Patient/family/caregiver demonstrates understanding of disease process, treatment plan, medications, and discharge instructions Progressing        METABOLIC, FLUID AND ELECTROLYTES - ADULT     Fluid balance maintained Progressing        PAIN - ADULT     Verbalizes/displays adequate comfort level or baseline comfort level Progressing        Potential for Falls     Patient will remain free of falls Progressing        SAFETY ADULT     Maintain or return to baseline ADL function Progressing     Maintain or return mobility status to optimal level Progressing     Patient will remain free of falls Progressing

## 2018-06-30 NOTE — OP NOTE
OPERATIVE REPORT  PATIENT NAME: Brenda Webster    :  1928  MRN: [id number]  Pt Location: [facility] OR ROOM 03    SURGERY DATE: 2018    Surgeon(s) and Role:     Víctor Renae MD - Primary    Preop Diagnosis:  Anemia, unspecified type [D64 9]    Post-Op Diagnosis Codes:     * Anemia, unspecified type [D64 9]     * Acute gastritis with bleeding [K29 01]     * Hiatal hernia [K44 9]    Procedure(s) (LRB):  ESOPHAGOGASTRODUODENOSCOPY (EGD) with antral biopsy (N/A)    Specimen(s):  ID Type Source Tests Collected by Time Destination   1 : Acute gastritis r/o HP Tissue Stomach TISSUE Darlene Cornell MD 2018 [id number]        Estimated Blood Loss:   Minimal    Drains:       Anesthesia Type:   IV Sedation with Anesthesia    Operative Indications:  Anemia, unspecified type [D64 9]  The patient is a pleasant 68-year-old female 1 month out from an angioplasty and stent insertion firing an acute MI who was admitted with an new anemia for which upper endoscopy is now indicated  Operative Findings: At the time of upper endoscopy the Z-line was found to be sharp at 36 cm  The patient had an acute gastritis which was clearly the etiology of her anemia  Photographs taken  The gastritis was present both in the body and antrum  Cold biopsies taken of the gastric antrum to rule out H pylori  The pylorus was patent  The duodenum intubated and free from pathology  Upon withdrawal of the scope and in retroflex view the patient was found to have a moderate-sized hiatal hernia  Complications:   None    Procedure and Technique:  The patient was taken to the operating room where they are properly identified, monitored, rolled into the left lateral decubitus position, bite block placed and sedated  Endoscope advanced to the 3rd portion of the duodenum and slowly withdrawn with the above findings noted  Scope withdrawn into the stomach  Retroflex view of the GE junction performed  Scope straightened    Cold biopsies taken of gastric antrum  Stomach desufflated  Scope withdrawn and the procedure completed uneventfully     I was present for the entire procedure    Patient Disposition:  PACU     SIGNATURE: Mickie Reyez MD  DATE: June 30, 2018  TIME: 9:52 AM

## 2018-06-30 NOTE — CONSULTS
Consultation - GI   Aliyah Potrillo 80 y o  female MRN: [id number]  Unit/Bed#: -01 Encounter: [id number]      Assessment/Plan     Assessment:  The patient is a pleasant 70-year-old female with a past medical history significant for coronary artery disease and type 2 diabetes mellitus presenting with an anemia after being started on aspirin and Plavix following a recent MI in May 2018  After lengthy and thoughtful conversation with the patient she is interested in pursuing all appropriate interventions to define the etiology of her anemia  She specifically states that she hopes to lived to be 8 years old  For this reason we will proceed with upper endoscopy today  Plan:  1  Upper endoscopy today  2   Based upon the results of today's upper endoscopy will make the appropriate recommendations regarding indications for colonoscopy either during this hospitalization or as an outpatient in follow-up in light of the patient's stated desire to continue to live a long productive life  And in light of the fact that she has never undergone colonoscopy previously  History of Present Illness   Physician Requesting Consult: Margot Adame MD  Reason for Consult / Principal Problem:   Consult / Principal Problem:  Anemia  Hx and PE limited by:   HPI: Aliyah Portillo is a 80y o  year old female who presents with a new anemia for which EGD is now indicated    Inpatient consult to gastroenterology  Consult performed by: Angela Enriquez ordered by: Yong Mauricio          Review of Systems   Constitutional: Positive for activity change and fatigue  Negative for appetite change  HENT: Negative  Eyes: Negative  Respiratory: Negative  Cardiovascular: The patient had a recent NSTEMI for which she was treated at [facility] with angioplasty and stent insertion x3  Postoperative echocardiogram revealed a injection fraction of 45%     Gastrointestinal:        The patient denies all symptoms referable to her gastrointestinal tract  She has never undergone upper endoscopy or colonoscopy in her life time  Endocrine: Negative  Genitourinary: Negative  Musculoskeletal:        The patient reports feet and leg swelling bilaterally which is what originally prompted her to present to the pump in emergency room yesterday  Skin: Negative  Allergic/Immunologic: Negative  Neurological:        The patient reports a diabetic stroke 1 week after her cardiac event  Review of the computer record from the [facility] suggest that this was a hypoglycemic transient neurologic event  Hematological: Negative  Psychiatric/Behavioral: Negative  Historical Information   Past Medical History:   Diagnosis Date    [facility] [address] [facility]     Myocardial infarct [facility] [address] )     Pt has 3 stents placed in the heart    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      Past Surgical History:   Procedure Laterality Date    APPENDECTOMY      CARDIAC CATHETERIZATION      3 stents    CAROTID ENDARTARECTOMY      GALLBLADDER SURGERY      HYSTERECTOMY       Social History   History   Alcohol Use No     History   Drug Use No     History   Smoking Status    Never Smoker   Smokeless Tobacco    Never Used     Family History: non-contributory    Meds/Allergies   all current active meds have been reviewed    Allergies   Allergen Reactions    Penicillins Rash       Objective     No intake or output data in the 24 hours ending 06/30/18 0855    Invasive Devices:   Peripheral IV 06/30/18 Right Wrist (Active)       Physical Exam   Constitutional: She is oriented to person, place, and time  The patient is a pleasant 31-year-old female who is awake alert and oriented x3  She is a reliable historian with regards to her current condition and past medical history  She has reliable to make appropriate decisions regarding her own health care  HENT:   Head: Normocephalic and atraumatic     Eyes: Conjunctivae are normal  Pupils are equal, round, and reactive to light  Neck: Normal range of motion  Neck supple  Cardiovascular: Normal rate and regular rhythm  Pulmonary/Chest: Effort normal and breath sounds normal    Abdominal: Soft  She exhibits no distension and no mass  There is no tenderness  There is no rebound and no guarding  Musculoskeletal: Normal range of motion  Neurological: She is alert and oriented to person, place, and time  Skin: Skin is warm and dry  Psychiatric: Her behavior is normal  Judgment and thought content normal    Vitals reviewed  Lab Results: I have personally reviewed pertinent reports  Imaging Studies: I have personally reviewed pertinent reports  EKG, Pathology, and Other Studies: I have personally reviewed pertinent reports  VTE Prophylaxis: Reason for no pharmacologic prophylaxis Anemia    Counseling / Coordination of Care  Total floor / unit time spent today 40 minutes  Greater than 50% of total time was spent with the patient and / or family counseling and / or coordination of care   A description of the counseling / coordination of care: 20

## 2018-06-30 NOTE — ASSESSMENT & PLAN NOTE
· Has improved    · Patient is status post 2 units of PRBCs  · Hemoglobin is no greater than 10  · Patient is status post a GI evaluation and did have an endoscopy earlier this morning  · She was found to have an acute active gastritis which was the suspected  cause of hemorrhage  · See the assessment in regards the gastroduodenitis

## 2018-06-30 NOTE — ANESTHESIA POSTPROCEDURE EVALUATION
Post-Op Assessment Note      CV Status:  Stable    Mental Status:  Alert and awake    Hydration Status:  Euvolemic    PONV Controlled:  None    Airway Patency:  Patent    Post Op Vitals Reviewed: Yes          Staff: Anesthesiologist           BP (P) 157/67 (06/30/18 0944)    Temp (!) (P) 96 2 °F (35 7 °C) (06/30/18 0944)    Pulse (!) (P) 49 (06/30/18 0944)   Resp     SpO2 (P) 100 % (06/30/18 0944)

## 2018-06-30 NOTE — PLAN OF CARE
Problem: Potential for Falls  Goal: Patient will remain free of falls  INTERVENTIONS:  - Assess patient frequently for physical needs  -  Identify cognitive and physical deficits and behaviors that affect risk of falls    -  [place] fall precautions as indicated by assessment   - Educate patient/family on patient safety including physical limitations  - Instruct patient to call for assistance with activity based on assessment  - Modify environment to reduce risk of injury  - Consider OT/PT consult to assist with strengthening/mobility   Outcome: Progressing      Problem: PAIN - ADULT  Goal: Verbalizes/displays adequate comfort level or baseline comfort level  Interventions:  - Encourage patient to monitor pain and request assistance  - Assess pain using appropriate pain scale  - Administer analgesics based on type and severity of pain and evaluate response  - Implement non-pharmacological measures as appropriate and evaluate response  - Consider cultural and social influences on pain and pain management  - Notify physician/advanced practitioner if interventions unsuccessful or patient reports new pain  Outcome: Progressing      Problem: INFECTION - ADULT  Goal: Absence or prevention of progression during hospitalization  INTERVENTIONS:  - Assess and monitor for signs and symptoms of infection  - Monitor lab/diagnostic results  - Monitor all insertion sites, i e  indwelling lines, tubes, and drains  - Monitor endotracheal (as able) and nasal secretions for changes in amount and color  - [place] appropriate cooling/warming therapies per order  - Administer medications as ordered  - Instruct and encourage patient and family to use good hand hygiene technique  - Identify and instruct in appropriate isolation precautions for identified infection/condition  Outcome: Progressing      Problem: SAFETY ADULT  Goal: Patient will remain free of falls  INTERVENTIONS:  - Assess patient frequently for physical needs  - Identify cognitive and physical deficits and behaviors that affect risk of falls    -  [place] fall precautions as indicated by assessment   - Educate patient/family on patient safety including physical limitations  - Instruct patient to call for assistance with activity based on assessment  - Modify environment to reduce risk of injury  - Consider OT/PT consult to assist with strengthening/mobility   Outcome: Progressing    Goal: Maintain or return to baseline ADL function  INTERVENTIONS:  -  Assess patient's ability to carry out ADLs; assess patient's baseline for ADL function and identify physical deficits which impact ability to perform ADLs (bathing, care of mouth/teeth, toileting, grooming, dressing, etc )  - Assess/evaluate cause of self-care deficits   - Assess range of motion  - Assess patient's mobility; develop plan if impaired  - Assess patient's need for assistive devices and provide as appropriate  - Encourage maximum independence but intervene and supervise when necessary  ¯ Involve family in performance of ADLs  ¯ Assess for home care needs following discharge   ¯ Request OT consult to assist with ADL evaluation and planning for discharge  ¯ Provide patient education as appropriate  Outcome: Progressing    Goal: Maintain or return mobility status to optimal level  INTERVENTIONS:  - Assess patient's baseline mobility status (ambulation, transfers, stairs, etc )    - Identify cognitive and physical deficits and behaviors that affect mobility  - Identify mobility aids required to assist with transfers and/or ambulation (gait belt, sit-to-stand, lift, walker, cane, etc )  - [place] fall precautions as indicated by assessment  - Record patient progress and toleration of activity level on Mobility SBAR; progress patient to next Phase/Stage  - Instruct patient to call for assistance with activity based on assessment  - Request Rehabilitation consult to assist with strengthening/weightbearing, etc   Outcome: Progressing      Problem: DISCHARGE PLANNING  Goal: Discharge to home or other facility with appropriate resources  INTERVENTIONS:  - Identify barriers to discharge w/patient and caregiver  - Arrange for needed discharge resources and transportation as appropriate  - Identify discharge learning needs (meds, wound care, etc )  - Arrange for interpretive services to assist at discharge as needed  - Refer to Case Management Department for coordinating discharge planning if the patient needs post-hospital services based on physician/advanced practitioner order or complex needs related to functional status, cognitive ability, or social support system  Outcome: Progressing      Problem: Knowledge Deficit  Goal: Patient/family/caregiver demonstrates understanding of disease process, treatment plan, medications, and discharge instructions  Complete learning assessment and assess knowledge base    Interventions:  - Provide teaching at level of understanding  - Provide teaching via preferred learning methods  Outcome: Progressing

## 2018-06-30 NOTE — CONSULTS
[facility]    (Previously [facility])   Cardiology Consultation      Chief Complaint:  Edema       History of Present Illness:    Briefly, this is an 80year old female with recent hospitalization at [facility] in 05/2018 who presents with lower extremity swelling  Patient was found to have anemia attributed to likely UGIB  She is undergoing an EGD by Dr Odalys Law of general surgery  Seen and examined  Reports feeling well  Denies CP, SOB, GRIFFIN  Reports L/E edema suddenly appeared  Review of Systems: a 12 point review of systems was conducted and is negative except for as mentioned in the HPI or as below  Review of Systems   Constitution: Negative for decreased appetite  HENT: Negative for sore throat  Cardiovascular: Positive for leg swelling  Negative for chest pain, dyspnea on exertion, irregular heartbeat, orthopnea, paroxysmal nocturnal dyspnea and syncope  Respiratory: Negative for shortness of breath and sleep disturbances due to breathing  Endocrine: Negative  Hematologic/Lymphatic: Negative  Skin: Negative  Musculoskeletal: Negative  Gastrointestinal:        See other providers' notes  Concern for GIB  Genitourinary: Negative  Neurological:        Recent TIA attributed to low glucose   Psychiatric/Behavioral: Negative for depression  Allergic/Immunologic: Negative          Past Medical and Surgical History:  Past Medical History:   Diagnosis Date    Diabetes ([address] )     [facility] [facility] [address] [facility]     Pt has 3 stents placed in the heart    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[address] [address] )      Past Surgical History:   Procedure Laterality Date    APPENDECTOMY      CARDIAC CATHETERIZATION      3 stents    CAROTID ENDARTARECTOMY      GALLBLADDER SURGERY      HYSTERECTOMY       History   Alcohol Use No     History   Drug Use No     History   Smoking Status    Never Smoker   Smokeless Tobacco    Never Used       Family History:  History reviewed  No pertinent family history  Medication:  Prescriptions Prior to Admission   Medication    Amino Acids (DAILY AMINO 6000 PO)    aspirin 81 mg chewable tablet    clopidogrel (PLAVIX) 75 mg tablet    atorvastatin (LIPITOR) 40 mg tablet    lisinopril (ZESTRIL) 5 mg tablet    meloxicam (MOBIC) 7 5 mg tablet    metoprolol tartrate (LOPRESSOR) 50 mg tablet    nitroglycerin (NITROSTAT) 0 4 mg SL tablet        Allergies: Allergies   Allergen Reactions    Penicillins Rash       Physical Exam:  Vitals: Blood pressure (!) 196/77, pulse 55, temperature (!) 96 7 °F (35 9 °C), temperature source Tympanic, resp  rate 18, height 5' (1 524 m), weight 47 7 kg (105 lb 3 2 oz), SpO2 95 %  , Body mass index is 20 55 kg/m² , Orthostatic Blood Pressures      Most Recent Value   Blood Pressure   196/77 filed at 06/30/2018 1125   Patient Position - Orthostatic VS  Lying filed at 06/30/2018 4311        Systolic (83GZA), ZXK:043 , Min:119 , LQY:453   , Diastolic (11NMA), TFO:91, Min:40, Max:77    Physical Exam   Constitutional: She is oriented to person, place, and time  She appears well-developed and well-nourished  HENT:   Head: Normocephalic and atraumatic  Eyes: EOM are normal  Pupils are equal, round, and reactive to light  Neck: Normal range of motion  Cardiovascular: Normal rate, regular rhythm and normal heart sounds  Exam reveals no gallop and no friction rub  No murmur heard  Pulmonary/Chest: Effort normal  No respiratory distress  She has wheezes  She has rales  She exhibits no tenderness  Abdominal: She exhibits no distension  There is no tenderness  There is no rebound  Musculoskeletal: Normal range of motion  She exhibits edema  Neurological: She is alert and oriented to person, place, and time  Skin: Skin is warm and dry  Psychiatric: She has a normal mood and affect  Nursing note and vitals reviewed  EKG: Sinus bradycardia  Anterior Q waves   Poor R wave progression  [facility] from 5/[facility] at [facility]:      Hemodynamics  160/6 160/60   Ventriculography  Normal LV function  Normal wall motion  Estimated LVEF 60%   Coronary Circulation      Left Main  Normal sized vessel  No disease   LAD  Normal sized vessel  Proximal 80% calcified stenosis  Mid LAD 90% diffuse calcified stenosis  D1 is normal size vessel that bifurcates into 2 vessels  70% calcified ostial Diagonal disease  1st branch and 50% stenosis  2nd branch has proximal 80% stenosis  DALJIT 2 flow to apex   LCX  Normal sized vessel  OM1 80% stenosis, small vessel  OM2 70% proximal stenosis, small vessel  RCA Dominant, Proximal   L to R collaterals to RCA   Interventions/Vessels  PCI to LAD and D1 with PTCA and MAYO   Guides/Wires  CLS 3 5, heparin, choice floppy wire   Devices    mid LAD: PTCA with 2 5x15  3 0x32 Promus MAYO to 18atm  Post dilated with 3 25x12 NC balloon to 16atm  D1: 2 25x12 balloon to 2nd branch  2 25x20 Promus MAYO to 12atm  prox LAD: PTCA with 2 5x15 balloon  3 5x12 Promus MAYO  Post dilated with 3 5x8 NC balloon to 20atm  Post % Stenosis  0% residual stenosis  DALJIT 3 flow   Closure Device  TR band   Complications  None       Assessment:  1  Severe multi vessel CAD involving LAD and RCA  2  Successful high risk PCI to proximal, middle LAD and D1    3  Normal LV function and low LVEDP      Lab Results:   Troponins:   Results from last 7 days  Lab Units 06/29/18  1312   TROPONIN I ng/mL <0 03     CBC with diff:   Results from last 7 days  Lab Units 06/30/18  0548 06/29/18  1312   WBC Thousand/uL 10 40 6 50   HEMOGLOBIN g/dL 10 6* 7 6*   HEMATOCRIT % 31 4* 23 0*   MCV fL 88 91   PLATELETS Thousands/uL 149 173   MCH pg 29 6 30 2   MCHC g/dL 33 9 33 2   RDW % 16 0* 13 9   MPV fL 8 8 9 5   NRBC AUTO /100 WBCs  --  0     CMP:  Results from last 7 days  Lab Units 06/30/18  0548 06/29/18  1312   SODIUM mmol/L 140 140   POTASSIUM mmol/L 4 1 4 3   CHLORIDE mmol/L 105 105   CO2 mmol/L 28 32* ANION GAP mmol/L 7 3*   BUN mg/dL 33* 44*   CREATININE mg/dL 0 74 0 93   GLUCOSE RANDOM mg/dL 102* 149*   CALCIUM mg/dL 9 3 9 2   AST U/L  --  24   ALT U/L  --  16   ALK PHOS U/L  --  53*   TOTAL PROTEIN g/dL  --  6 4   BILIRUBIN TOTAL mg/dL  --  0 40   EGFR ml/min/1 73sq m 72 55     Assessment and Plan:    80year old female with CAD s/p complex PCI to pLAD, mLAD into D1 on DAPT now presenting with CHF symptoms  CAD: r/o ACS  - repeat troponin  - check EKG  - c/w DAPT  - unfortunately, given the length of stents placed last month, stopping DAPT will be life-threatening  - outpatient stress if troponin is not significantly high    CHF: Acute on chronic HFpEF (EF of 45%), ACC/AHA Stage C, NYHA Class 3  I suspect CHF is secondary to ischemia caused by anemia  However, relative bradycardia will make her hemodynamics worse now that her anemia has been fixed  - heart rate is too strictly controlled for unrevascularized lesions  Unfortunately, this puts her at risk of CHF  - decrease beta-blocker to metoprolol 25mg BiD  - IV lasix 40mg qD for now  - give hydralazine 5mg IVP q4h PRN for SBP>140 for now  - I will re-assess her meds at discharge  She may be able to go home on the same medications she arrived on  EP: No acute abnormalities except for beta-blocker induced bradycardia      Will follow

## 2018-06-30 NOTE — CASE MANAGEMENT
Initial Clinical Review    TRANSFER FROM [facility]STIT[facility] ED    Admission: Date/Time/Statement: 6/29/18 @ 1625     Orders Placed This Encounter   Procedures    Inpatient Admission     Standing Status:   Standing     Number of Occurrences:   1     Order Specific Question:   Admitting Physician     Answer:   Krysten Barron     Order Specific Question:   Level of Care     Answer:   Med Surg [16]     Order Specific Question:   Estimated length of stay     Answer:   More than 2 Midnights     Order Specific Question:   Certification     Answer:   I certify that inpatient services are medically necessary for this patient for a duration of greater than two midnights  See H&P and MD Progress Notes for additional information about the patient's course of treatment  Chief Complaint: No chief complaint on file  History of Illness: Ryder Magaña is a 80 y o  female who presents with lower ext swelling  Swelling started this morning when she woke up  No previous episodes  Pt does not take any diuretic at home  Recently treated for NSTEMI at [address] with multiple stents placed  Pt denies any CP, SOB, or palpitations        In ER patient was found to be anemia with Hgb < 8  Stool positive for occult blood   Admitted for gi eval    ED Vital Signs:   ED Triage Vitals   Temperature Pulse Respirations Blood Pressure SpO2   06/29/18 1629 06/29/18 1629 06/29/18 1629 06/29/18 1629 06/29/18 1629   (!) 96 9 °F (36 1 °C) 58 16 136/62 93 %      Temp Source Heart Rate Source Patient Position - Orthostatic VS BP Location FiO2 (%)   06/29/18 1629 06/29/18 1629 06/30/18 0032 06/29/18 1629 --   Tympanic Monitor Lying Right arm       Pain Score       06/29/18 1656       No Pain        Wt Readings from Last 1 Encounters:   06/29/18 47 7 kg (105 lb 3 2 oz)       Vital Signs (abnormal):    above    Abnormal Labs/Diagnostic Test Results:    CXR:      Mild suboptimal inspiration without finding of active focal pulmonary  consolidation with air bronchograms or large pleural effusion  Cardiomegaly with mild congestive changes without overt cardiac failure  CO2  32  AG  3  BUN  44  BNP   592  H/H    7 6/23   Repeat     10 6/31 4    ED Treatment:   Medication Administration - No Administrations Displayed (No Start Event Found)     None          Past Medical/Surgical History: Active Ambulatory Problems     Diagnosis Date Noted    Type 2 diabetes mellitus without complication ([facility] ) 98/93/4158    Gastritis and gastroduodenitis with hemorrhage 06/30/2018     Resolved Ambulatory Problems     Diagnosis Date Noted    Anemia 06/29/2018     Past Medical History:   Diagnosis Date    Diabetes ([facility] )     Myocardial infarct ([facility] )     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([place] [address] )        Admitting Diagnosis: GI bleed [K92 2]    Age/Sex: 80 y o  female    Assessment/Plan:   Anemia, unspecified   Assessment & Plan     -suspected GI bleed with stool positive for occult blood  -transfuse 2 units PRBC  -will continue ASA/Plavix given patient recently had stents placed  -no melena or hematemesis  -GI consult  -Protonix IV          Diastolic dysfunction   Assessment & Plan     -monitor I/O and daily wt  -start lasix given lower ext edema  -cont lisinopril and betablocker        CAD (coronary artery disease)   Assessment & Plan     -recent stent placement in May at [facility]  -cont asa/plavix  -cont statin, betablocker  -cardio eval          CKD (chronic kidney disease) stage 3, GFR 30-59 ml/min   Assessment & Plan     -FS ac/hs  -diabetic diet      Anticipated Length of Stay:  Patient will be admitted on an Inpatient basis with an anticipated length of stay of  Greater than 2 midnights     Justification for Hospital Stay: anemia         Admission Orders:   IP  6/29  @    1736  Scheduled Meds:   Current Facility-Administered Medications:  acetaminophen 650 mg Oral Q6H PRN Fercho Rudolph MD   aspirin 81 mg Oral Daily Fercho Rudolph MD   atorvastatin 40 mg Oral Daily With Eugenio Us MD   clopidogrel 75 mg Oral Daily Radha Lorenz MD   hydrALAZINE 5 mg Intravenous Q6H PRN Nick Winter MD   lisinopril 20 mg Oral Daily Nick Winter MD   metoprolol tartrate 25 mg Oral BID Nick Winter MD   pantoprazole 40 mg Intravenous Q12H [facility] Yamil Hernandez MD   sucralfate 1,000 mg Oral BID AC Yamil Hernandez MD     Continuous Infusions:    PRN Meds:   acetaminophen    hydrALAZINE     Cons GI  Cons cardiology  Tele  PT  2 U PRBC    EGD   6/30  Preop Diagnosis:  Anemia, unspecified type [D64 9]     Post-Op Diagnosis Codes:     * Anemia, unspecified type [D64 9]     * Acute gastritis with bleeding [K29 01]     * Hiatal hernia [K44 9]     Procedure(s) (LRB):  ESOPHAGOGASTRODUODENOSCOPY (EGD) with antral biopsy (N/A)

## 2018-06-30 NOTE — ASSESSMENT & PLAN NOTE
· Patient is status post a Cardiology evaluation  · At this time they would like to continue aspirin and Plavix in view of the amount of time since the recent cardiac catheterization and stent placement  · Will monitor H&H closely

## 2018-06-30 NOTE — PROGRESS NOTES
Progress Note - Saul Chandler 9/18/1928, 80 y o  female MRN: [id number]    Unit/Bed#: -01 Encounter: [id number]    Primary Care Provider: No primary care provider on file  Date and time admitted to hospital: 6/29/2018  4:25 PM        Coronary artery disease involving native coronary artery of native heart without angina pectoris   Assessment & Plan    · Patient is status post a Cardiology evaluation  · At this time they would like to continue aspirin and Plavix in view of the amount of time since the recent cardiac catheterization and stent placement  · Will monitor H&H closely        Anemia, unspecified   Assessment & Plan    · Has improved  · Patient is status post 2 units of PRBCs  · Hemoglobin is no greater than 10  · Patient is status post a GI evaluation and did have an endoscopy earlier this morning  · She was found to have an acute active gastritis which was the suspected  cause of hemorrhage  · See the assessment in regards the gastroduodenitis        * Acute gastritis with hemorrhage   Assessment & Plan    · Continue PPI IV b i d   · Diet advancement as per GI  · Monitor H&H in the AM            VTE Pharmacologic Prophylaxis:   Pharmacologic: Pharmacologic VTE Prophylaxis contraindicated due to Active GI bleed  Mechanical VTE Prophylaxis in Place: Yes    Patient Centered Rounds: I have performed bedside rounds with nursing staff today  Discussions with Specialists or Other Care Team Provider:  GI and Cardiology    Education and Discussions with Family / Patient:      Time Spent for Care: 15 minutes  More than 50% of total time spent on counseling and coordination of care as described above      Current Length of Stay: 1 day(s)    Current Patient Status: Inpatient   Certification Statement: The patient will continue to require additional inpatient hospital stay due to The need for IV Protonix and the need for monitoring her H&H    Discharge Plan:  Hopeful discharge planning in 24 hours    Code Status: Level 4 - Comfort Care      Subjective:   No complaints no distress feels a lot better than when she came into the hospital    Objective:     Vitals:   Temp (24hrs), Av 9 °F (36 1 °C), Min:96 2 °F (35 7 °C), Max:98 6 °F (37 °C)    HR:  [49-68] 55  Resp:  [16-93] 18  BP: (119-196)/(40-77) 182/64  SpO2:  [92 %-100 %] 95 %  Body mass index is 20 55 kg/m²  Input and Output Summary (last 24 hours): Intake/Output Summary (Last 24 hours) at 18 1342  Last data filed at 18 1001   Gross per 24 hour   Intake              125 ml   Output                0 ml   Net              125 ml       Physical Exam:     Physical Exam   Constitutional: She is oriented to person, place, and time  She appears well-developed and well-nourished  HENT:   Head: Normocephalic and atraumatic  Nose: Nose normal    Mouth/Throat: Oropharynx is clear and moist    Eyes: Conjunctivae and EOM are normal  Pupils are equal, round, and reactive to light  Neck: Normal range of motion  Neck supple  No JVD present  No thyromegaly present  Cardiovascular: Normal rate, regular rhythm and intact distal pulses  Exam reveals no gallop and no friction rub  No murmur heard  Pulmonary/Chest: Effort normal and breath sounds normal  No respiratory distress  Abdominal: Soft  Bowel sounds are normal  She exhibits no distension and no mass  There is no tenderness  There is no guarding  Musculoskeletal: Normal range of motion  She exhibits no edema  Lymphadenopathy:     She has no cervical adenopathy  Neurological: She is alert and oriented to person, place, and time  No cranial nerve deficit  Skin: Skin is warm  No rash noted  No erythema  Psychiatric: She has a normal mood and affect  Her behavior is normal    Vitals reviewed        Additional Data:     Labs:      Results from last 7 days  Lab Units 18  0548 18  1312   WBC Thousand/uL 10 40 6 50   HEMOGLOBIN g/dL 10 6* 7 6*   HEMATOCRIT % 31 4* 23 0*   PLATELETS Thousands/uL 149 173   NEUTROS PCT %  --  63   LYMPHS PCT %  --  23   MONOS PCT %  --  10   EOS PCT %  --  4       Results from last 7 days  Lab Units 06/30/18  0548 06/29/18  1312   SODIUM mmol/L 140 140   POTASSIUM mmol/L 4 1 4 3   CHLORIDE mmol/L 105 105   CO2 mmol/L 28 32*   BUN mg/dL 33* 44*   CREATININE mg/dL 0 74 0 93   CALCIUM mg/dL 9 3 9 2   TOTAL PROTEIN g/dL  --  6 4   BILIRUBIN TOTAL mg/dL  --  0 40   ALK PHOS U/L  --  53*   ALT U/L  --  16   AST U/L  --  24   GLUCOSE RANDOM mg/dL 102* 149*       Results from last 7 days  Lab Units 06/29/18  1312   INR  1 06       Results from last 7 days  Lab Units 06/30/18  1128 06/30/18  0754 06/29/18  2022   POC GLUCOSE mg/dl 104 100 105             * I Have Reviewed All Lab Data Listed Above  * Additional Pertinent Lab Tests Reviewed: Nivia 66 Admission Reviewed    Imaging:    Imaging Reports Reviewed Today Include:  Endoscopy report  Imaging Personally Reviewed by Myself Includes:  None    Recent Cultures (last 7 days):           Last 24 Hours Medication List:     Current Facility-Administered Medications:  acetaminophen 650 mg Oral Q6H PRN Avani Ching MD   aspirin 81 mg Oral Daily Avani Ching MD   atorvastatin 40 mg Oral Daily With Jeremías Hyde MD   clopidogrel 75 mg Oral Daily Avani Ching MD   hydrALAZINE 5 mg Intravenous Q6H PRN Nick Winter MD   lisinopril 20 mg Oral Daily Nick Winter MD   metoprolol tartrate 25 mg Oral BID Nick Winter MD   pantoprazole 40 mg Intravenous Q12H [facility] Melina Davis MD   sucralfate 1,000 mg Oral BID [facility] Melina Davis MD        Today, Patient Was Seen By: Bere Hong MD    ** Please Note: Dictation voice to text software may have been used in the creation of this document   **

## 2018-07-01 VITALS
TEMPERATURE: 98.2 F | RESPIRATION RATE: 18 BRPM | OXYGEN SATURATION: 93 % | SYSTOLIC BLOOD PRESSURE: 163 MMHG | BODY MASS INDEX: 20.65 KG/M2 | DIASTOLIC BLOOD PRESSURE: 62 MMHG | WEIGHT: 105.2 LBS | HEIGHT: 60 IN | HEART RATE: 60 BPM

## 2018-07-01 LAB
ANION GAP SERPL CALCULATED.3IONS-SCNC: 7 MMOL/L (ref 4–13)
ATRIAL RATE: 55 BPM
ATRIAL RATE: 60 BPM
BASOPHILS # BLD AUTO: 0.1 THOUSANDS/ΜL (ref 0–0.1)
BASOPHILS NFR BLD AUTO: 1 % (ref 0–2)
BUN SERPL-MCNC: 29 MG/DL (ref 7–25)
CALCIUM SERPL-MCNC: 9.3 MG/DL (ref 8.6–10.5)
CHLORIDE SERPL-SCNC: 103 MMOL/L (ref 98–107)
CO2 SERPL-SCNC: 29 MMOL/L (ref 21–31)
CREAT SERPL-MCNC: 0.83 MG/DL (ref 0.6–1.2)
EOSINOPHIL # BLD AUTO: 0.3 THOUSAND/ΜL (ref 0–0.61)
EOSINOPHIL NFR BLD AUTO: 4 % (ref 0–5)
ERYTHROCYTE [DISTWIDTH] IN BLOOD BY AUTOMATED COUNT: 17 % (ref 11.5–14.5)
GFR SERPL CREATININE-BSD FRML MDRD: 63 ML/MIN/1.73SQ M
GLUCOSE SERPL-MCNC: 174 MG/DL (ref 65–140)
GLUCOSE SERPL-MCNC: 181 MG/DL (ref 65–99)
GLUCOSE SERPL-MCNC: 255 MG/DL (ref 65–140)
HCT VFR BLD AUTO: 33.7 % (ref 34.8–46.1)
HGB BLD-MCNC: 11.5 G/DL (ref 12–16)
LYMPHOCYTES # BLD AUTO: 1.3 THOUSANDS/ΜL (ref 0.6–4.47)
LYMPHOCYTES NFR BLD AUTO: 15 % (ref 21–51)
MCH RBC QN AUTO: 29.5 PG (ref 26–34)
MCHC RBC AUTO-ENTMCNC: 34.1 G/DL (ref 31–37)
MCV RBC AUTO: 86 FL (ref 81–99)
MONOCYTES # BLD AUTO: 1 THOUSAND/ΜL (ref 0.17–1.22)
MONOCYTES NFR BLD AUTO: 12 % (ref 2–12)
NEUTROPHILS # BLD AUTO: 5.7 THOUSANDS/ΜL (ref 1.4–6.5)
NEUTS SEG NFR BLD AUTO: 68 % (ref 42–75)
NRBC BLD AUTO-RTO: 0 /100 WBCS
P AXIS: 12 DEGREES
P AXIS: 27 DEGREES
PLATELET # BLD AUTO: 159 THOUSANDS/UL (ref 149–390)
PMV BLD AUTO: 8.8 FL (ref 8.6–11.7)
POTASSIUM SERPL-SCNC: 3.6 MMOL/L (ref 3.5–5.5)
PR INTERVAL: 184 MS
PR INTERVAL: 186 MS
QRS AXIS: -22 DEGREES
QRS AXIS: -23 DEGREES
QRSD INTERVAL: 64 MS
QRSD INTERVAL: 70 MS
QT INTERVAL: 410 MS
QT INTERVAL: 434 MS
QTC INTERVAL: 410 MS
QTC INTERVAL: 415 MS
RBC # BLD AUTO: 3.9 MILLION/UL (ref 3.9–5.2)
SODIUM SERPL-SCNC: 139 MMOL/L (ref 134–143)
T WAVE AXIS: 79 DEGREES
T WAVE AXIS: 88 DEGREES
TROPONIN I SERPL-MCNC: 0.03 NG/ML
VENTRICULAR RATE: 55 BPM
VENTRICULAR RATE: 60 BPM
WBC # BLD AUTO: 8.4 THOUSAND/UL (ref 4.8–10.8)

## 2018-07-01 PROCEDURE — C9113 INJ PANTOPRAZOLE SODIUM, VIA: HCPCS | Performed by: SURGERY

## 2018-07-01 PROCEDURE — 85025 COMPLETE CBC W/AUTO DIFF WBC: CPT | Performed by: SURGERY

## 2018-07-01 PROCEDURE — 82948 REAGENT STRIP/BLOOD GLUCOSE: CPT

## 2018-07-01 PROCEDURE — 99232 SBSQ HOSP IP/OBS MODERATE 35: CPT | Performed by: STUDENT IN AN ORGANIZED HEALTH CARE EDUCATION/TRAINING PROGRAM

## 2018-07-01 PROCEDURE — 99239 HOSP IP/OBS DSCHRG MGMT >30: CPT | Performed by: HOSPITALIST

## 2018-07-01 PROCEDURE — 84484 ASSAY OF TROPONIN QUANT: CPT | Performed by: STUDENT IN AN ORGANIZED HEALTH CARE EDUCATION/TRAINING PROGRAM

## 2018-07-01 PROCEDURE — 80048 BASIC METABOLIC PNL TOTAL CA: CPT | Performed by: SURGERY

## 2018-07-01 RX ORDER — SUCRALFATE ORAL 1 G/10ML
1000 SUSPENSION ORAL
Qty: 420 ML | Refills: 0 | Status: SHIPPED | OUTPATIENT
Start: 2018-07-01

## 2018-07-01 RX ORDER — PANTOPRAZOLE SODIUM 40 MG/1
40 TABLET, DELAYED RELEASE ORAL
Status: DISCONTINUED | OUTPATIENT
Start: 2018-07-01 | End: 2018-07-01 | Stop reason: HOSPADM

## 2018-07-01 RX ORDER — FUROSEMIDE 10 MG/ML
20 INJECTION INTRAMUSCULAR; INTRAVENOUS ONCE
Status: COMPLETED | OUTPATIENT
Start: 2018-07-01 | End: 2018-07-01

## 2018-07-01 RX ORDER — PANTOPRAZOLE SODIUM 40 MG/1
40 TABLET, DELAYED RELEASE ORAL
Qty: 30 TABLET | Refills: 0 | Status: SHIPPED | OUTPATIENT
Start: 2018-07-01

## 2018-07-01 RX ADMIN — CLOPIDOGREL BISULFATE 75 MG: 75 TABLET ORAL at 09:11

## 2018-07-01 RX ADMIN — ASPIRIN 81 MG 81 MG: 81 TABLET ORAL at 09:11

## 2018-07-01 RX ADMIN — PANTOPRAZOLE SODIUM 40 MG: 40 INJECTION, POWDER, FOR SOLUTION INTRAVENOUS at 09:12

## 2018-07-01 RX ADMIN — SUCRALFATE 1000 MG: 1 SUSPENSION ORAL at 06:44

## 2018-07-01 RX ADMIN — FUROSEMIDE 20 MG: 10 INJECTION, SOLUTION INTRAVENOUS at 12:09

## 2018-07-01 RX ADMIN — LISINOPRIL 20 MG: 20 TABLET ORAL at 09:11

## 2018-07-01 RX ADMIN — METOPROLOL TARTRATE 25 MG: 25 TABLET ORAL at 09:11

## 2018-07-01 NOTE — ASSESSMENT & PLAN NOTE
· All symptoms have resolved, patient denies any pain and or discomfort pre or post meals  · Patient is status post an endoscopy yesterday morning, no complications noted  · At this time she is medically stable for discharge  · She has been cleared for discharge by both Dr Mike Munson and Cardiology  · Of note she has to remain on both aspirin and Plavix in view of her recent cardiac stenting  · I went over in great depth with the patient in regards to what signs and symptoms she must be aware of in view of continuing the aspirin and Plavix in the setting of having a gastritis with recent hemorrhage  These included chest pain, nausea, vomiting, diarrhea, hematuria, hematemesis, palpitations, shortness of breath    Patient verbalized understanding  · Will discharge home on Protonix and sucralfate

## 2018-07-01 NOTE — DISCHARGE SUMMARY
Discharge- Saul Chandler 9/18/1928, 80 y o  female MRN: [id number]    Unit/Bed#: -01 Encounter: [id number]    Primary Care Provider: No primary care provider on file  Date and time admitted to hospital: 6/29/2018  4:25 PM        * Acute gastritis with hemorrhage   Assessment & Plan    · All symptoms have resolved, patient denies any pain and or discomfort pre or post meals  · Patient is status post an endoscopy yesterday morning, no complications noted  · At this time she is medically stable for discharge  · She has been cleared for discharge by both Dr Venkat Harden and Cardiology  · Of note she has to remain on both aspirin and Plavix in view of her recent cardiac stenting  · I went over in great depth with the patient in regards to what signs and symptoms she must be aware of in view of continuing the aspirin and Plavix in the setting of having a gastritis with recent hemorrhage  These included chest pain, nausea, vomiting, diarrhea, hematuria, hematemesis, palpitations, shortness of breath    Patient verbalized understanding  · Will discharge home on Protonix and sucralfate         Anemia, unspecified   Assessment & Plan    · H&H is stable  · She is status post 2 units of PRBCs transfusions on this admission  · Outpatient follow-up with primary care physician for routine post hospital discharge blood work and monitoring        Coronary artery disease involving native coronary artery of native heart without angina pectoris   Assessment & Plan    · Once again patient has been cleared by Cardiology for discharge  · Okay to resume all of her pre-admission medications at the preadmission which include the aspirin and Plavix  · Additionally okay to continue lisinopril and Lopressor  · Outpatient follow-up with Cardiology at [facility]         Diastolic dysfunction   Assessment & Plan    · See outlined as above for the coronary artery disease  · Continue home Lasix          CKD (chronic kidney disease) stage 3, GFR 30-59 ml/min   Assessment & Plan    Creatinine is stable  Outpatient follow-up with her primary care physician        Hiatal hernia   Assessment & Plan    · Continue home PPI               Discharging Physician / Practitioner: John Martinez MD  PCP: No primary care provider on file  Admission Date:   Admission Orders     Ordered        06/29/18 1736  Inpatient Admission  Once             Discharge Date: 07/01/18    Resolved Problems  Date Reviewed: 6/30/2018    None          Consultations During Hospital Stay:  · GI/surgery  · Cardiology    Procedures Performed:     · Endoscopy    Significant Findings / Test Results:     · Acute hemorrhagic gastritis    Incidental Findings:   · None     Test Results Pending at Discharge (will require follow up): · None     Outpatient Tests Requested:  · None    Complications:  None    Reason for Admission:  Symptomatic anemia    Hospital Course:     Brenda Webster is a 80 y o  female patient who originally presented to the hospital on 6/29/2018 due to acute anemia  Please refer to the initial history and physical examination completed by Dr Marcelino Lozano for the initial presenting features in complaints  Patient initially presented with a complaint of swelling in her legs  She was found to have an anemia and stools that were positive for occult blood  She recently had cardiac stenting at [address] [place] [address] and has been on both aspirin and Plavix  She was transfused 2 units of PRBCs  A GI/surgical consult was obtained with Dr Kiana Crocker  She underwent an endoscopy  on 06/30/2018  She was found to have an active gastritis with recent hemorrhage  She had initially being on a Protonix drip  There were no complications from the endoscopy  She was started on IV Protonix and Carafate  She remained in-house for a day after the endoscopy  Hemoglobin and hematocrit were stable  She was seen by Cardiology also    At this time recommendations were made by the cardiologist that aspirin Plavix need to be continued and it is imperative that the be continued in view of the timing in regards to her recent stenting in her heart at [facility]  Patient was counseled extensively on the signs and symptoms of active bleeding  She was deemed stable for discharge by both Cardiology and GI/surgery on 07/01/2018    Please see above list of diagnoses and related plan for additional information  Condition at Discharge: good     Discharge Day Visit / Exam:     Subjective:  No complaints no distress patient is already dressed and is ready to go home  Vitals: Blood Pressure: 163/62 (07/01/18 1136)  Pulse: 60 (07/01/18 1136)  Temperature: 98 2 °F (36 8 °C) (07/01/18 1136)  Temp Source: Tympanic (07/01/18 1136)  Respirations: 18 (07/01/18 1136)  Height: 5' (152 4 cm) (06/29/18 2100)  Weight - Scale: 47 7 kg (105 lb 3 2 oz) (06/29/18 2100)  SpO2: 93 % (07/01/18 1136)  Exam:   Physical Exam   Constitutional: She is oriented to person, place, and time  She appears well-developed and well-nourished  HENT:   Head: Normocephalic and atraumatic  Nose: Nose normal    Mouth/Throat: Oropharynx is clear and moist    Eyes: Conjunctivae and EOM are normal  Pupils are equal, round, and reactive to light  Neck: Normal range of motion  Neck supple  No JVD present  No thyromegaly present  Cardiovascular: Normal rate, regular rhythm and intact distal pulses  Exam reveals no gallop and no friction rub  No murmur heard  Pulmonary/Chest: Effort normal and breath sounds normal  No respiratory distress  Abdominal: Soft  Bowel sounds are normal  She exhibits no distension and no mass  There is no tenderness  There is no guarding  Musculoskeletal: Normal range of motion  She exhibits no edema  Lymphadenopathy:     She has no cervical adenopathy  Neurological: She is alert and oriented to person, place, and time  No cranial nerve deficit  Skin: Skin is warm  No rash noted  No erythema  Psychiatric: She has a normal mood and affect  Her behavior is normal    Vitals reviewed  Discussion with Family:  None    Discharge instructions/Information to patient and family:   See after visit summary for information provided to patient and family  Provisions for Follow-Up Care:  See after visit summary for information related to follow-up care and any pertinent home health orders  Disposition:     Home    For Discharges to [facility] SNF:   · Not Applicable to this Patient - Not Applicable to this Patient    Planned Readmission:   NonePlanned Readmission:     Discharge Statement:  I spent 35 minutes discharging the patient  This time was spent on the day of discharge  I had direct contact with the patient on the day of discharge  Greater than 50% of the total time was spent examining patient, answering all patient questions, arranging and discussing plan of care with patient as well as directly providing post-discharge instructions  Additional time then spent on discharge activities  Discharge Medications:  See after visit summary for reconciled discharge medications provided to patient and family        ** Please Note: This note has been constructed using a voice recognition system **

## 2018-07-01 NOTE — PROGRESS NOTES
[facility]    (Previously [facility])   Cardiology Consultation        Subjective:   Patient seen and examined  No significant events overnight  Her leg edema has resolved both subjectively and objectively  She reports laying flat overnight  She sleeps flat at home as well (this hadn't changed)  Patient's home meds have been laid out by her family members who are on vacation in a weekly medication organizer for her  Patient does not manage her own meds  Telemetry: Unable to locate on telemetery    Physical Exam:  Vitals: Blood pressure 166/67, pulse 65, temperature 97 8 °F (36 6 °C), temperature source Tympanic, resp  rate 18, height 5' (1 524 m), weight 47 7 kg (105 lb 3 2 oz), SpO2 92 %  , Body mass index is 20 55 kg/m²    General Appearance:    Alert, cooperative, no distress, appears stated age   Head:    Normocephalic, without obvious abnormality, atraumatic   Eyes:    PERRL, conjunctiva/corneas clear, EOM's intact, fundi     benign, both eyes   Ears:    Normal TM's and external ear canals, both ears   Nose:   Nares normal, septum midline, mucosa normal, no drainage    or sinus tenderness   Throat:   Lips, mucosa, and tongue normal; teeth and gums normal   Neck:   Supple, symmetrical, trachea midline, no adenopathy;     thyroid:  no enlargement/tenderness/nodules; no carotid    bruit or JVD   Back:     Symmetric, no curvature, ROM normal, no CVA tenderness   Lungs:     Clear to auscultation bilaterally, respirations unlabored   Chest Wall:    No tenderness or deformity    Heart:    Regular rate and rhythm, S1 and S2 normal, no murmur, rub   or gallop   Breast Exam:    No tenderness, masses, or nipple abnormality   Abdomen:     Soft, non-tender, bowel sounds active all four quadrants,     no masses, no organomegaly   Genitalia:    Normal female without lesion, discharge or tenderness   Rectal:    Normal tone, no masses or tenderness; guaiac negative stool   Extremities:   Extremities normal, atraumatic, no cyanosis or edema   Pulses:   2+ and symmetric all extremities   Skin:   Skin color, texture, turgor normal, no rashes or lesions   Lymph nodes:   Cervical, supraclavicular, and axillary nodes normal   Neurologic:   CNII-XII intact, normal strength, sensation and reflexes     throughout         Medications:      Current Facility-Administered Medications:     acetaminophen (TYLENOL) tablet 650 mg, 650 mg, Oral, Q6H PRN, Isaac Gunter MD, 650 mg at 06/29/18 2135    aspirin chewable tablet 81 mg, 81 mg, Oral, Daily, Isaac Gunter MD, 81 mg at 07/01/18 0911    atorvastatin (LIPITOR) tablet 40 mg, 40 mg, Oral, Daily With Sabino Chilel MD, 40 mg at 06/30/18 1658    clopidogrel (PLAVIX) tablet 75 mg, 75 mg, Oral, Daily, Isaac Gunter MD, 75 mg at 07/01/18 0911    furosemide (LASIX) injection 20 mg, 20 mg, Intravenous, Once, Nick Winter MD    hydrALAZINE (APRESOLINE) injection 5 mg, 5 mg, Intravenous, Q6H PRN, Nick Winter MD    lisinopril (ZESTRIL) tablet 20 mg, 20 mg, Oral, Daily, Nick Winter MD, 20 mg at 07/01/18 0911    metoprolol tartrate (LOPRESSOR) tablet 25 mg, 25 mg, Oral, BID, Nick Winter MD, 25 mg at 07/01/18 0911    pantoprazole (PROTONIX) injection 40 mg, 40 mg, Intravenous, Q12H [address], Sandra Tian MD, 40 mg at 07/01/18 0912    sucralfate (CARAFATE) oral suspension 1,000 mg, 1,000 mg, Oral, BID Aditya Bhatti MD, 1,000 mg at 07/01/18 0644     Labs & Results:    Troponins:   Results from last 7 days  Lab Units 07/01/18  0540 06/29/18  1312   TROPONIN I ng/mL 0 03 <0 03       CBC with diff:   Results from last 7 days  Lab Units 07/01/18  0540 06/30/18  0548   WBC Thousand/uL 8 40 10 40   HEMOGLOBIN g/dL 11 5* 10 6*   HEMATOCRIT % 33 7* 31 4*   MCV fL 86 88   PLATELETS Thousands/uL 159 149         CMP:   Results from last 7 days  Lab Units 07/01/18  0540 06/30/18  0548 06/29/18  1312   SODIUM mmol/L 139 140 140 POTASSIUM mmol/L 3 6 4 1 4 3   CHLORIDE mmol/L 103 105 105   CO2 mmol/L 29 28 32*   BUN mg/dL 29* 33* 44*   CREATININE mg/dL 0 83 0 74 0 93   GLUCOSE RANDOM mg/dL 181* 102* 149*   AST U/L  --   --  24   ALT U/L  --   --  16   TOTAL PROTEIN g/dL  --   --  6 4   BILIRUBIN TOTAL mg/dL  --   --  0 40   EGFR ml/min/1 73sq m 63 72 55       Lipid Profile:       Coags:   Results from last 7 days  Lab Units 06/29/18  1312   INR  1 06   PTT seconds 34           80year old female with CAD s/p complex PCI to pLAD, mLAD into D1 on DAPT now presenting with CHF symptoms      CAD: ruled out for ACS  - repeat troponin was negative as well  - c/w DAPT  - unfortunately, given the length of stents placed last month, stopping DAPT will be life-threatening  - no further ischemic evaluation at this time     CHF: Acute on chronic HFpEF (EF of 45%), ACC/AHA Stage C, NYHA Class 3  I suspect CHF is secondary to ischemia caused by anemia  - heart rate is too strictly controlled to reduce angina  This should be re-evaluated on follow-up  - I would like patient to go home on her admission meds given that her caretakers are not here and her meds have already been laid out  Fine-tuning of her medication may lead to inappropriate ingestion and confusion for the patient so I will leave these as is (at the risk of increased chance of re-hospitalization)      EP: No acute abnormalities except for beta-blocker induced bradycardia  - will continue    No further inpatient workup  Patient is very eager to leave

## 2018-07-01 NOTE — ASSESSMENT & PLAN NOTE
· H&H is stable  · She is status post 2 units of PRBCs transfusions on this admission  · Outpatient follow-up with primary care physician for routine post hospital discharge blood work and monitoring

## 2018-07-01 NOTE — ASSESSMENT & PLAN NOTE
· Once again patient has been cleared by Cardiology for discharge  · Okay to resume all of her pre-admission medications at the preadmission which include the aspirin and Plavix  · Additionally okay to continue lisinopril and Lopressor  · Outpatient follow-up with Cardiology at [facility]

## 2018-07-01 NOTE — PLAN OF CARE
DISCHARGE PLANNING     Discharge to home or other facility with appropriate resources Progressing        HEMATOLOGIC - ADULT     Maintains hematologic stability Progressing        INFECTION - ADULT     Absence or prevention of progression during hospitalization Progressing        Knowledge Deficit     Patient/family/caregiver demonstrates understanding of disease process, treatment plan, medications, and discharge instructions Progressing        METABOLIC, FLUID AND ELECTROLYTES - ADULT     Fluid balance maintained Progressing        PAIN - ADULT     Verbalizes/displays adequate comfort level or baseline comfort level Progressing        Potential for Falls     Patient will remain free of falls Progressing        SAFETY ADULT     Maintain or return to baseline ADL function Progressing     Maintain or return mobility status to optimal level Progressing     Patient will remain free of falls Progressing

## 2018-07-01 NOTE — PROGRESS NOTES
Progress Note - General Surgery   Carisa Hancock 80 y o  female MRN: [id number]  Unit/Bed#: -01 Encounter: [id number]    Assessment:  Postop day 1  Status post upper endoscopy and cold biopsies of gastric antrum for the definitive diagnosis of her anemia found to be secondary to an acute gastritis  The findings from the upper endoscopy were explained to the patient and all questions answered to her satisfaction  Plan:  The patient is clinically stable from a general surgery perspective for discharge with plans for outpatient follow-up  I would advised that she be discharged to home both on Protonix and Carafate slurry  Subjective/Objective   Chief Complaint:  Anemia    Subjective:  Patient reports feeling well today and would like to be released    Objective:  Abdomen soft nontender nondistended no guarding rebound or peritoneal signs    Blood pressure 166/67, pulse 65, temperature 97 8 °F (36 6 °C), temperature source Tympanic, resp  rate 18, height 5' (1 524 m), weight 47 7 kg (105 lb 3 2 oz), SpO2 92 %  ,Body mass index is 20 55 kg/m²        Intake/Output Summary (Last 24 hours) at 07/01/18 1037  Last data filed at 06/30/18 2105   Gross per 24 hour   Intake              453 ml   Output                0 ml   Net              453 ml       Invasive Devices     Peripheral Intravenous Line            Peripheral IV 06/30/18 Right Wrist 1 day                Physical Exam: /67 (BP Location: Left arm)   Pulse 65   Temp 97 8 °F (36 6 °C) (Tympanic)   Resp 18   Ht 5' (1 524 m)   Wt 47 7 kg (105 lb 3 2 oz)   SpO2 92%   BMI 20 55 kg/m²     General Appearance:    Alert, cooperative, no distress, appears stated age   Head:    Normocephalic, without obvious abnormality, atraumatic   Eyes:    PERRL, conjunctiva/corneas clear, EOM's intact, fundi     benign, both eyes   Ears:    Normal TM's and external ear canals, both ears   Nose:   Nares normal, septum midline, mucosa normal, no drainage    or sinus tenderness   Throat:   Lips, mucosa, and tongue normal; teeth and gums normal   Neck:   Supple, symmetrical, trachea midline, no adenopathy;     thyroid:  no enlargement/tenderness/nodules; no carotid    bruit or JVD   Back:     Symmetric, no curvature, ROM normal, no CVA tenderness   Lungs:     Clear to auscultation bilaterally, respirations unlabored   Chest Wall:    No tenderness or deformity    Heart:    Regular rate and rhythm, S1 and S2 normal, no murmur, rub   or gallop   Breast Exam:    No tenderness, masses, or nipple abnormality   Abdomen:     Soft, non-tender, bowel sounds active all four quadrants,     no masses, no organomegaly   Genitalia:    Normal female without lesion, discharge or tenderness   Rectal:    Normal tone, no masses or tenderness; guaiac negative stool   Extremities:   Extremities normal, atraumatic, no cyanosis or edema   Pulses:   2+ and symmetric all extremities   Skin:   Skin color, texture, turgor normal, no rashes or lesions   Lymph nodes:   Cervical, supraclavicular, and axillary nodes normal   Neurologic:   CNII-XII intact, normal strength, sensation and reflexes     throughout       Lab, Imaging and other studies:I have personally reviewed pertinent lab results

## 2018-07-01 NOTE — NURSING NOTE
AVS reviewed with pt and her sister  Both verbalized understanding  IV access removed  Pt escorted safely by PCA to family car

## 2018-07-04 ENCOUNTER — APPOINTMENT (EMERGENCY)
Dept: RADIOLOGY | Facility: HOSPITAL | Age: 83
End: 2018-07-04
Payer: MEDICARE

## 2018-07-04 ENCOUNTER — HOSPITAL ENCOUNTER (EMERGENCY)
Facility: HOSPITAL | Age: 83
Discharge: HOME | End: 2018-07-04
Attending: EMERGENCY MEDICINE
Payer: MEDICARE

## 2018-07-04 VITALS
HEART RATE: 55 BPM | WEIGHT: 105 LBS | OXYGEN SATURATION: 97 % | RESPIRATION RATE: 18 BRPM | TEMPERATURE: 96.7 F | DIASTOLIC BLOOD PRESSURE: 45 MMHG | SYSTOLIC BLOOD PRESSURE: 170 MMHG | BODY MASS INDEX: 20.62 KG/M2 | HEIGHT: 60 IN
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
## 2018-07-04 DIAGNOSIS — S51.819A SKIN TEAR OF FOREARM WITHOUT COMPLICATION, INITIAL ENCOUNTER: ICD-10-CM

## 2018-07-04 DIAGNOSIS — S42.309A FRACTURE, HUMERUS CLOSED: Primary | ICD-10-CM

## 2018-07-04 DIAGNOSIS — S81.819A SKIN TEAR OF LOWER LEG WITHOUT COMPLICATION, INITIAL ENCOUNTER: ICD-10-CM

## 2018-07-04 PROCEDURE — 73060 X-RAY EXAM OF HUMERUS: CPT

## 2018-07-04 PROCEDURE — 99283 EMERGENCY DEPT VISIT LOW MDM: CPT

## 2018-07-04 PROCEDURE — 73562 X-RAY EXAM OF KNEE 3: CPT

## 2018-07-04 RX ORDER — GINSENG 100 MG
2 CAPSULE ORAL ONCE
Status: COMPLETED | OUTPATIENT
Start: 2018-07-04 | End: 2018-07-04

## 2018-07-04 RX ORDER — GINSENG 100 MG
2 CAPSULE ORAL ONCE
Status: DISCONTINUED | OUTPATIENT
Start: 2018-07-04 | End: 2018-07-04 | Stop reason: CLARIF

## 2018-07-04 RX ORDER — TRAMADOL HYDROCHLORIDE 50 MG/1
50 TABLET ORAL ONCE
Status: DISCONTINUED | OUTPATIENT
Start: 2018-07-04 | End: 2018-07-04 | Stop reason: HOSPADM

## 2018-07-04 RX ORDER — TRAMADOL HYDROCHLORIDE 50 MG/1
50 TABLET ORAL EVERY 6 HOURS PRN
Qty: 30 TABLET | Refills: 0 | Status: SHIPPED | OUTPATIENT
Start: 2018-07-04 | End: 2018-07-14

## 2018-07-04 RX ADMIN — BACITRACIN ZINC 1 LARGE APPLICATION: 500 OINTMENT TOPICAL at 18:09

## 2018-07-04 RX ADMIN — BACITRACIN ZINC 2 SMALL APPLICATION: 500 OINTMENT TOPICAL at 18:05

## 2018-07-04 NOTE — ED NOTES
Small skin tear noted on the posterior aspect of the left elbow  Wound cleansed and non-adherent dressing and cling applied        Edilberto Avila RN  07/04/18 0051

## 2018-07-04 NOTE — ED PROVIDER NOTES
History  No chief complaint on file  Patient reports to the emergency department after a fall  Patient reports that she was standing at the top of her steps when 2 dogs ran between her legs and knocked her down the steps  Patient has bleeding coming from her right arm or right leg and scraped her left elbow  Patient denies any head trauma  Patient called her neighbors and daughter who brought her to the emergency department immediately by private car  Patient reports that she was recently inpatient at another hospital in [facility]  Patient was being treated for anemia with blood transfusions  Patient also reports that she recently had a heart attack for which she has 3 cardiac stents that were recently placed  Patient denies any syncope passing out dizziness or palpitations or chest pain  History provided by:  Patient   used: No        Prior to Admission Medications   Prescriptions Last Dose Informant Patient Reported? Taking?    Amino Acids (DAILY AMINO 6000 PO)   Yes No   Sig: Take 20 mg by mouth daily   aspirin 81 mg chewable tablet   Yes No   Sig: Chew 81 mg   atorvastatin (LIPITOR) 40 mg tablet   Yes No   Sig: Take 40 mg by mouth   clopidogrel (PLAVIX) 75 mg tablet   Yes No   Sig: Take 75 mg by mouth daily   lisinopril (ZESTRIL) 5 mg tablet   Yes No   Sig: Take 5 mg by mouth daily   meloxicam (MOBIC) 7 5 mg tablet   Yes No   Sig: Take 7 5 mg by mouth daily   metoprolol tartrate (LOPRESSOR) 50 mg tablet   Yes No   Sig: Take 50 mg by mouth 2 (two) times a day   nitroglycerin (NITROSTAT) 0 4 mg SL tablet   Yes No   Sig: Place 0 4 mg under the tongue as needed   pantoprazole (PROTONIX) 40 mg tablet   No No   Sig: Take 1 tablet (40 mg total) by mouth daily in the early morning   sucralfate (CARAFATE) 1 g/10 mL suspension   No No   Sig: Take 10 mL (1,000 mg total) by mouth 2 (two) times a day before meals      Facility-Administered Medications: None       Past Medical History:   Diagnosis Date    Diabetes ([facility] [address] )     Myocardial infarct ([facility] [address] )     Pt has 3 stents placed in the heart    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[facility])        Past Surgical History:   Procedure Laterality Date    APPENDECTOMY      CARDIAC CATHETERIZATION      3 stents    CAROTID ENDARTARECTOMY      ESOPHAGOGASTRODUODENOSCOPY N/A 6/30/2018    Procedure: ESOPHAGOGASTRODUODENOSCOPY (EGD) with antral biopsy;  Surgeon: Clark Duane, MD;  Location: [address] [place] [address];  Service: General    GALLBLADDER SURGERY      HYSTERECTOMY         No family history on file  I have reviewed and agree with the history as documented  Social History   Substance Use Topics    Smoking status: Never Smoker    Smokeless tobacco: Never Used    Alcohol use No        Review of Systems   Constitutional: Negative for chills and fever  HENT: Negative for rhinorrhea and sore throat  Eyes: Negative for visual disturbance  Respiratory: Negative for cough and shortness of breath  Cardiovascular: Negative for chest pain and leg swelling  Gastrointestinal: Negative for abdominal pain, diarrhea, nausea and vomiting  Genitourinary: Negative for dysuria  Musculoskeletal: Negative for back pain and myalgias  Skin: Negative for rash  Multiple areas of skin tears right upper arm, right leg/knee, left elbow   Neurological: Negative for dizziness and headaches  Psychiatric/Behavioral: Negative for confusion  All other systems reviewed and are negative  Physical Exam  Physical Exam   Constitutional: She is oriented to person, place, and time  She appears well-developed and well-nourished  HENT:   Nose: Nose normal    Mouth/Throat: Oropharynx is clear and moist  No oropharyngeal exudate  Eyes: Conjunctivae and EOM are normal  Pupils are equal, round, and reactive to light  No scleral icterus  Neck: Normal range of motion  Neck supple  No JVD present  No tracheal deviation present     Cardiovascular: Normal rate, regular rhythm and normal heart sounds  No murmur heard  Pulmonary/Chest: Effort normal and breath sounds normal  No respiratory distress  She has no wheezes  She has no rales  Abdominal: Soft  Bowel sounds are normal  There is no tenderness  There is no guarding  Musculoskeletal: Normal range of motion  She exhibits no edema or tenderness  Neurological: She is alert and oriented to person, place, and time  No cranial nerve deficit or sensory deficit  She exhibits normal muscle tone  5/5 motor, nl sens   Skin: Skin is warm and dry  Multiple deep skin tears right upper arm laterally entire course of upper arm to lateral elbow and small area affected lateral forearm; right anterior knee with skin tear from patellar region to proximal upper portion of lower leg; left elbow with superficial abrasion   Psychiatric: She has a normal mood and affect  Her behavior is normal    Nursing note and vitals reviewed  Vital Signs  ED Triage Vitals   Temp Pulse Resp BP SpO2   -- -- -- -- --      Temp src Heart Rate Source Patient Position - Orthostatic VS BP Location FiO2 (%)   -- -- -- -- --      Pain Score       --           There were no vitals filed for this visit  Visual Acuity      ED Medications  Medications - No data to display    Diagnostic Studies  Results Reviewed     None                 No orders to display              Procedures  Procedures       Phone Contacts  ED Phone Contact    ED Course                               [facility]    Disposition  Final diagnoses:   None     ED Disposition     None      Follow-up Information    None         Patient's Medications   Discharge Prescriptions    No medications on file     No discharge procedures on file      ED Provider  Electronically Signed by           Serafin White DO  07/04/18 DO Adam  07/04/18 7078

## 2018-07-04 NOTE — DISCHARGE INSTRUCTIONS
Arm Fracture in Adults   WHAT YOU NEED TO KNOW:   An arm fracture is a crack or break in one or more of the bones in your arm  An arm fracture may be caused by a fall onto an outstretched hand  It may also be caused by trauma from a car accident or a sports injury  Osteoporosis (brittle bones) can increase your risk for a fracture  DISCHARGE INSTRUCTIONS:   Return to the emergency department if:   · The pain in your injured arm does not get better or gets worse, even after you rest and take medicine  · Your injured arm, hand, or fingers feel numb  · Your arm is swollen, red, and feels warm  · Your skin over the arm fracture is swollen, cold, or pale  · You cannot move your arm, hand, or fingers  Contact your healthcare provider if:   · You have a fever  · Your brace or splint becomes wet, damaged, or comes off  · You have questions or concerns about your injury, treatment, or care  Medicines:   · NSAIDs , such as ibuprofen, help decrease swelling and pain  This medicine is available with or without a doctor's order  NSAIDs can cause stomach bleeding or kidney problems in certain people  If you take blood thinner medicine, always ask your healthcare provider if NSAIDs are safe for you  Always read the medicine label and follow directions  · Acetaminophen  decreases pain  It is available without a doctor's order  Ask how much to take and how often to take it  Follow directions  Acetaminophen can cause liver damage if not taken correctly  · Prescription pain medicine  may be given  Ask how to take this medicine safely  · Take your medicine as directed  Contact your healthcare provider if you think your medicine is not helping or if you have side effects  Tell him or her if you are allergic to any medicine  Keep a list of the medicines, vitamins, and herbs you take  Include the amounts, and when and why you take them  Bring the list or the pill bottles to follow-up visits   Carry your medicine list with you in case of an emergency  Follow up with your healthcare provider within 1 week: You may need to see a bone specialist within 3 to 4 days if you need surgery or further treatment for your arm fracture  Write down your questions so you remember to ask them during your visits  Rest:  You should rest your arm as much as possible  Ask your healthcare provider when you can put pressure or weight on your arm  Also ask when you can return to sports or vigorous exercises  Ice:  Apply ice on your arm for 15 to 20 minutes every hour or as directed  Use an ice pack, or put crushed ice in a plastic bag  Cover it with a towel  Ice helps prevent tissue damage and decreases swelling and pain  Elevate:  Elevate your arm above the level of your heart as often as you can  This will help decrease swelling and pain  Prop your arm on pillows or blankets to keep it elevated comfortably  Care for your cast or splint:  Ask your healthcare provider when it is okay to bathe  Do not get your cast or splint wet  Before you take a bath or shower, cover your cast or splint with a plastic bag  Tape the bag to your skin to help keep water out  Hold your arm away from the water in case the bag leaks  · Check the skin around your cast or splint each day for any redness or open skin  · Do not use a sharp or pointed object to scratch your skin under the cast or splint  Physical therapy:  A physical therapist teaches you exercises to help improve movement and strength, and to decrease pain  © 2017 [address] Information is for End User's use only and may not be sold, redistributed or otherwise used for commercial purposes  All illustrations and images included in CareNotes® are the copyrighted property of [email or web address] , [employer]  or Marty Holbrook  The above information is an  only  It is not intended as medical advice for individual conditions or treatments   Talk to your doctor, nurse or pharmacist before following any medical regimen to see if it is safe and effective for you  Skin Tear   WHAT YOU NEED TO KNOW:   A skin tear occurs when the layers of weakened skin split open from an injury  , elderly, and chronically or critically ill people are at higher risk for skin tears  Long-term use of steroids can also increase the risk  It is important to treat and prevent skin tears to prevent infection  DISCHARGE INSTRUCTIONS:   Medicines:   · Medicines  may be given to reduce your pain or to treat or prevent an infection  Do not wait until the pain is severe before you ask for more medicine  · Take your medicine as directed  Contact your healthcare provider if you think your medicine is not helping or if you have side effects  Tell him of her if you are allergic to any medicine  Keep a list of the medicines, vitamins, and herbs you take  Include the amounts, and when and why you take them  Bring the list or the pill bottles to follow-up visits  Carry your medicine list with you in case of an emergency  Follow up with your healthcare provider as directed:  Write down your questions so you remember to ask them during your visits  Wound care: You may be referred to a wound specialist who will teach you how to clean your wound properly  Ask for more information about wound care  Prevent another skin tear:   · Clean, moisturize, and protect your skin  Baths, hot showers, and soap can dry your skin and increase your risk for skin tears  Take tepid showers, use mild soap as directed, and gently pat your skin dry  Use lotion to keep your skin moist after you shower  Wear long sleeves, pants, and protective footwear  · Move carefully  Ask for help if you cannot lift yourself well enough to avoid dragging your skin when you move  · Keep your home safe  Cover sharp corners, keep your pathways clear, and turn on lights so you can see clearly   Ask for more information if you have questions about home safety  · Drink liquids as directed  Ask your healthcare provider how much liquid to drink each day and which liquids are best for you  Liquids will help keep your skin moist and protected from future skin tears  · Eat high-protein foods  Examples are lean meats, fish, low-fat dairy products, and beans  A dietitian may help you create high-protein meal plans to help with wound healing  Contact your healthcare provider if:   · You have redness, swelling, pus, or a bad odor coming from your wound  · Blood soaks through your bandage  · You have increased pain, even after you take pain medicine  · Your wound tears open again  Return to the emergency department if:   · You have a fever  © 2017 [address] Information is for End User's use only and may not be sold, redistributed or otherwise used for commercial purposes  All illustrations and images included in CareNotes® are the copyrighted property of [employer]  or Marty Holbrook  The above information is an  only  It is not intended as medical advice for individual conditions or treatments  Talk to your doctor, nurse or pharmacist before following any medical regimen to see if it is safe and effective for you  Fall Prevention   WHAT YOU NEED TO KNOW:   Fall prevention includes ways to make your home and other areas safer  It also includes ways you can move more carefully to prevent a fall  Health conditions that cause changes in your blood pressure, vision, or muscle strength and coordination may increase your risk for falls  Medicines may also increase your risk for falls if they make you dizzy, weak, or sleepy  DISCHARGE INSTRUCTIONS:   Call 911 or have someone else call if:   · You have fallen and are unconscious  · You have fallen and cannot move part of your body  Contact your healthcare provider if:   · You have fallen and have pain or a headache      · You have questions or concerns about your condition or care  Fall prevention tips:   · Stand or sit up slowly  This may help you keep your balance and prevent falls  · Use assistive devices as directed  Your healthcare provider may suggest that you use a cane or walker to help you keep your balance  You may need to have grab bars put in your bathroom near the toilet or in the shower  · Wear shoes that fit well and have soles that   Wear shoes both inside and outside  Use slippers with good   Do not wear shoes with high heels  · Wear a personal alarm  This is a device that allows you to call 911 if you fall and need help  Ask your healthcare provider for more information  · Stay active  Exercise can help strengthen your muscles and improve your balance  Your healthcare provider may recommend water aerobics or walking  He or she may also recommend physical therapy to improve your coordination  Never start an exercise program without talking to your healthcare provider first      · Manage your medical conditions  Keep all appointments with your healthcare providers  Visit your eye doctor as directed  Home safety tips:   · Add items to prevent falls in the bathroom  Put nonslip strips on your bath or shower floor to prevent you from slipping  Use a bath mat if you do not have carpet in the bathroom  This will prevent you from falling when you step out of the bath or shower  Use a shower seat so you do not need to stand while you shower  Sit on the toilet or a chair in your bathroom to dry yourself and put on clothing  This will prevent you from losing your balance from drying or dressing yourself while you are standing  · Keep paths clear  Remove books, shoes, and other objects from walkways and stairs  Place cords for telephones and lamps out of the way so that you do not need to walk over them  Tape them down if you cannot move them  Remove small rugs   If you cannot remove a rug, secure it with double-sided tape  This will prevent you from tripping  · Install bright lights in your home  Use night lights to help light paths to the bathroom or kitchen  Always turn on the light before you start walking  · Keep items you use often on shelves within reach  Do not use a step stool to help you reach an item  · Paint or place reflective tape on the edges of your stairs  This will help you see the stairs better  Follow up with your healthcare provider as directed:  Write down your questions so you remember to ask them during your visits  © 2017 [address] Information is for End User's use only and may not be sold, redistributed or otherwise used for commercial purposes  All illustrations and images included in CareNotes® are the copyrighted property of [employer]  or Marty Holbrook  The above information is an  only  It is not intended as medical advice for individual conditions or treatments  Talk to your doctor, nurse or pharmacist before following any medical regimen to see if it is safe and effective for you

## (undated) DEVICE — DISPOSABLE BIOPSY FORCEPS: Brand: DISPOSABLE BIOPSY FORCEPS

## (undated) DEVICE — GLOVE SRG BIOGEL 7

## (undated) DEVICE — BITE BLOCK MAXI 60FR LF STRAP